# Patient Record
Sex: MALE | Race: WHITE | NOT HISPANIC OR LATINO | Employment: OTHER | URBAN - NONMETROPOLITAN AREA
[De-identification: names, ages, dates, MRNs, and addresses within clinical notes are randomized per-mention and may not be internally consistent; named-entity substitution may affect disease eponyms.]

---

## 2023-08-30 ENCOUNTER — HOSPITAL ENCOUNTER (EMERGENCY)
Facility: HOSPITAL | Age: 38
Discharge: HOME | End: 2023-08-30
Attending: EMERGENCY MEDICINE | Admitting: EMERGENCY MEDICINE

## 2023-08-30 ENCOUNTER — APPOINTMENT (EMERGENCY)
Dept: CT IMAGING | Facility: HOSPITAL | Age: 38
End: 2023-08-30

## 2023-08-30 ENCOUNTER — OFFICE VISIT (OUTPATIENT)
Dept: URGENT CARE | Facility: CLINIC | Age: 38
End: 2023-08-30
Payer: COMMERCIAL

## 2023-08-30 VITALS
RESPIRATION RATE: 18 BRPM | TEMPERATURE: 98 F | HEART RATE: 94 BPM | WEIGHT: 315 LBS | HEIGHT: 70 IN | SYSTOLIC BLOOD PRESSURE: 182 MMHG | OXYGEN SATURATION: 99 % | BODY MASS INDEX: 45.1 KG/M2 | DIASTOLIC BLOOD PRESSURE: 100 MMHG

## 2023-08-30 VITALS
TEMPERATURE: 98.3 F | OXYGEN SATURATION: 96 % | HEART RATE: 70 BPM | DIASTOLIC BLOOD PRESSURE: 100 MMHG | RESPIRATION RATE: 20 BRPM | SYSTOLIC BLOOD PRESSURE: 216 MMHG

## 2023-08-30 DIAGNOSIS — I10 HTN (HYPERTENSION): ICD-10-CM

## 2023-08-30 DIAGNOSIS — M54.50 LOWER BACK PAIN: Primary | ICD-10-CM

## 2023-08-30 DIAGNOSIS — N39.0 UTI (URINARY TRACT INFECTION): ICD-10-CM

## 2023-08-30 DIAGNOSIS — R10.9 RIGHT FLANK PAIN: Primary | ICD-10-CM

## 2023-08-30 LAB
ALBUMIN SERPL BCP-MCNC: 4.8 G/DL (ref 3.5–5)
ALP SERPL-CCNC: 41 U/L (ref 34–104)
ALT SERPL W P-5'-P-CCNC: 51 U/L (ref 7–52)
ANION GAP SERPL CALCULATED.3IONS-SCNC: 8 MMOL/L
APTT PPP: 28 SECONDS (ref 23–37)
AST SERPL W P-5'-P-CCNC: 35 U/L (ref 13–39)
BACTERIA UR QL AUTO: NORMAL /HPF
BASOPHILS # BLD AUTO: 0.06 THOUSANDS/ÂΜL (ref 0–0.1)
BASOPHILS NFR BLD AUTO: 1 % (ref 0–1)
BILIRUB SERPL-MCNC: 1.21 MG/DL (ref 0.2–1)
BILIRUB UR QL STRIP: NEGATIVE
BUN SERPL-MCNC: 17 MG/DL (ref 5–25)
CALCIUM SERPL-MCNC: 9.7 MG/DL (ref 8.4–10.2)
CHLORIDE SERPL-SCNC: 102 MMOL/L (ref 96–108)
CLARITY UR: CLEAR
CO2 SERPL-SCNC: 27 MMOL/L (ref 21–32)
COLOR UR: YELLOW
CREAT SERPL-MCNC: 0.99 MG/DL (ref 0.6–1.3)
EOSINOPHIL # BLD AUTO: 0.3 THOUSAND/ÂΜL (ref 0–0.61)
EOSINOPHIL NFR BLD AUTO: 2 % (ref 0–6)
ERYTHROCYTE [DISTWIDTH] IN BLOOD BY AUTOMATED COUNT: 17.2 % (ref 11.6–15.1)
GFR SERPL CREATININE-BSD FRML MDRD: 95 ML/MIN/1.73SQ M
GLUCOSE SERPL-MCNC: 102 MG/DL (ref 65–140)
GLUCOSE UR STRIP-MCNC: NEGATIVE MG/DL
HCT VFR BLD AUTO: 42.3 % (ref 36.5–49.3)
HGB BLD-MCNC: 12.9 G/DL (ref 12–17)
HGB UR QL STRIP.AUTO: NEGATIVE
IMM GRANULOCYTES # BLD AUTO: 0.11 THOUSAND/UL (ref 0–0.2)
IMM GRANULOCYTES NFR BLD AUTO: 1 % (ref 0–2)
INR PPP: 0.95 (ref 0.84–1.19)
KETONES UR STRIP-MCNC: NEGATIVE MG/DL
LACTATE SERPL-SCNC: 1.2 MMOL/L (ref 0.5–2)
LEUKOCYTE ESTERASE UR QL STRIP: NEGATIVE
LIPASE SERPL-CCNC: 23 U/L (ref 11–82)
LYMPHOCYTES # BLD AUTO: 2.05 THOUSANDS/ÂΜL (ref 0.6–4.47)
LYMPHOCYTES NFR BLD AUTO: 16 % (ref 14–44)
MCH RBC QN AUTO: 19.7 PG (ref 26.8–34.3)
MCHC RBC AUTO-ENTMCNC: 30.5 G/DL (ref 31.4–37.4)
MCV RBC AUTO: 65 FL (ref 82–98)
MONOCYTES # BLD AUTO: 0.53 THOUSAND/ÂΜL (ref 0.17–1.22)
MONOCYTES NFR BLD AUTO: 4 % (ref 4–12)
NEUTROPHILS # BLD AUTO: 9.6 THOUSANDS/ÂΜL (ref 1.85–7.62)
NEUTS SEG NFR BLD AUTO: 76 % (ref 43–75)
NITRITE UR QL STRIP: NEGATIVE
NON-SQ EPI CELLS URNS QL MICRO: NORMAL /HPF
NRBC BLD AUTO-RTO: 0 /100 WBCS
PH UR STRIP.AUTO: 6 [PH]
PLATELET # BLD AUTO: 335 THOUSANDS/UL (ref 149–390)
PMV BLD AUTO: 10.1 FL (ref 8.9–12.7)
POTASSIUM SERPL-SCNC: 4 MMOL/L (ref 3.5–5.3)
PROT SERPL-MCNC: 8 G/DL (ref 6.4–8.4)
PROT UR STRIP-MCNC: ABNORMAL MG/DL
PROTHROMBIN TIME: 13 SECONDS (ref 11.6–14.5)
RBC # BLD AUTO: 6.55 MILLION/UL (ref 3.88–5.62)
RBC #/AREA URNS AUTO: NORMAL /HPF
SODIUM SERPL-SCNC: 137 MMOL/L (ref 135–147)
SP GR UR STRIP.AUTO: 1.02 (ref 1–1.03)
UROBILINOGEN UR QL STRIP.AUTO: 0.2 E.U./DL
WBC # BLD AUTO: 12.65 THOUSAND/UL (ref 4.31–10.16)
WBC #/AREA URNS AUTO: NORMAL /HPF

## 2023-08-30 PROCEDURE — 74176 CT ABD & PELVIS W/O CONTRAST: CPT

## 2023-08-30 PROCEDURE — 85025 COMPLETE CBC W/AUTO DIFF WBC: CPT | Performed by: EMERGENCY MEDICINE

## 2023-08-30 PROCEDURE — G1004 CDSM NDSC: HCPCS

## 2023-08-30 PROCEDURE — 96375 TX/PRO/DX INJ NEW DRUG ADDON: CPT

## 2023-08-30 PROCEDURE — 36415 COLL VENOUS BLD VENIPUNCTURE: CPT | Performed by: EMERGENCY MEDICINE

## 2023-08-30 PROCEDURE — 81001 URINALYSIS AUTO W/SCOPE: CPT | Performed by: EMERGENCY MEDICINE

## 2023-08-30 PROCEDURE — 83605 ASSAY OF LACTIC ACID: CPT | Performed by: EMERGENCY MEDICINE

## 2023-08-30 PROCEDURE — G0382 LEV 3 HOSP TYPE B ED VISIT: HCPCS

## 2023-08-30 PROCEDURE — 83690 ASSAY OF LIPASE: CPT | Performed by: EMERGENCY MEDICINE

## 2023-08-30 PROCEDURE — 85610 PROTHROMBIN TIME: CPT | Performed by: EMERGENCY MEDICINE

## 2023-08-30 PROCEDURE — 99285 EMERGENCY DEPT VISIT HI MDM: CPT | Performed by: EMERGENCY MEDICINE

## 2023-08-30 PROCEDURE — 80053 COMPREHEN METABOLIC PANEL: CPT | Performed by: EMERGENCY MEDICINE

## 2023-08-30 PROCEDURE — 99284 EMERGENCY DEPT VISIT MOD MDM: CPT

## 2023-08-30 PROCEDURE — 96374 THER/PROPH/DIAG INJ IV PUSH: CPT

## 2023-08-30 PROCEDURE — 96361 HYDRATE IV INFUSION ADD-ON: CPT

## 2023-08-30 PROCEDURE — 85730 THROMBOPLASTIN TIME PARTIAL: CPT | Performed by: EMERGENCY MEDICINE

## 2023-08-30 RX ORDER — DULOXETIN HYDROCHLORIDE 60 MG/1
60 CAPSULE, DELAYED RELEASE ORAL DAILY
COMMUNITY
Start: 2023-08-29

## 2023-08-30 RX ORDER — CEPHALEXIN 250 MG/1
500 CAPSULE ORAL ONCE
Status: COMPLETED | OUTPATIENT
Start: 2023-08-30 | End: 2023-08-30

## 2023-08-30 RX ORDER — FENOFIBRATE 145 MG/1
145 TABLET, COATED ORAL DAILY
COMMUNITY
Start: 2023-06-26 | End: 2024-06-25

## 2023-08-30 RX ORDER — CELECOXIB 50 MG/1
50 CAPSULE ORAL DAILY
COMMUNITY

## 2023-08-30 RX ORDER — PANTOPRAZOLE SODIUM 40 MG/1
40 TABLET, DELAYED RELEASE ORAL DAILY
COMMUNITY
Start: 2023-08-23 | End: 2024-08-22

## 2023-08-30 RX ORDER — KETOROLAC TROMETHAMINE 30 MG/ML
30 INJECTION, SOLUTION INTRAMUSCULAR; INTRAVENOUS ONCE
Status: COMPLETED | OUTPATIENT
Start: 2023-08-30 | End: 2023-08-30

## 2023-08-30 RX ORDER — DICLOFENAC SODIUM 75 MG/1
75 TABLET, DELAYED RELEASE ORAL EVERY 12 HOURS PRN
COMMUNITY
Start: 2023-06-05

## 2023-08-30 RX ORDER — TRAZODONE HYDROCHLORIDE 100 MG/1
100 TABLET ORAL
COMMUNITY
Start: 2023-08-29

## 2023-08-30 RX ORDER — VALSARTAN 320 MG/1
320 TABLET ORAL DAILY
COMMUNITY
Start: 2023-08-23 | End: 2024-08-22

## 2023-08-30 RX ORDER — ONDANSETRON 2 MG/ML
4 INJECTION INTRAMUSCULAR; INTRAVENOUS ONCE
Status: COMPLETED | OUTPATIENT
Start: 2023-08-30 | End: 2023-08-30

## 2023-08-30 RX ORDER — CEPHALEXIN 500 MG/1
500 CAPSULE ORAL 4 TIMES DAILY
Qty: 20 CAPSULE | Refills: 0 | Status: SHIPPED | OUTPATIENT
Start: 2023-08-30 | End: 2023-09-04

## 2023-08-30 RX ORDER — HYDROCHLOROTHIAZIDE 25 MG/1
25 TABLET ORAL DAILY
COMMUNITY
Start: 2023-08-23 | End: 2024-08-22

## 2023-08-30 RX ADMIN — SODIUM CHLORIDE 1000 ML: 0.9 INJECTION, SOLUTION INTRAVENOUS at 10:43

## 2023-08-30 RX ADMIN — CEPHALEXIN 500 MG: 250 CAPSULE ORAL at 11:42

## 2023-08-30 RX ADMIN — KETOROLAC TROMETHAMINE 30 MG: 30 INJECTION, SOLUTION INTRAMUSCULAR; INTRAVENOUS at 10:41

## 2023-08-30 RX ADMIN — ONDANSETRON 4 MG: 2 INJECTION INTRAMUSCULAR; INTRAVENOUS at 10:41

## 2023-08-30 NOTE — ED PROVIDER NOTES
History  Chief Complaint   Patient presents with   • Flank Pain     Right side flank pain with nausea and diarrhea since Friday. Sent from urgent care for further eval     Patient is a 65-year-old male presents the emergency department complaining of pain in the right lower back and right flank region radiating around to the right side of the abdomen started about 5 days ago associated with some nausea no vomiting has had diarrhea no constipation has had subjective fevers and chills. Patient with history of multiple prior kidney stones. No dysuria or blood in the urine or difficulty urinating. History provided by:  Patient  Flank Pain  Pain location:  R flank  Pain quality: aching and cramping    Pain radiates to:  Back  Pain severity:  Moderate  Onset quality:  Gradual  Duration:  5 days  Timing:  Intermittent  Progression:  Waxing and waning  Chronicity:  New  Associated symptoms: nausea    Associated symptoms: no chest pain, no chills, no constipation, no cough, no diarrhea, no dysuria, no fatigue, no fever, no hematuria, no shortness of breath, no sore throat and no vomiting        Prior to Admission Medications   Prescriptions Last Dose Informant Patient Reported? Taking?    DULoxetine (CYMBALTA) 60 mg delayed release capsule   Yes No   Sig: Take 60 mg by mouth daily   celecoxib (CeleBREX) 50 MG capsule   Yes No   Sig: Take 50 mg by mouth daily   diclofenac (VOLTAREN) 75 mg EC tablet   Yes No   Sig: Take 75 mg by mouth every 12 (twelve) hours as needed   fenofibrate (TRICOR) 145 mg tablet   Yes No   Sig: Take 145 mg by mouth daily   hydrochlorothiazide (HYDRODIURIL) 25 mg tablet   Yes No   Sig: Take 25 mg by mouth daily   pantoprazole (PROTONIX) 40 mg tablet   Yes No   Sig: Take 40 mg by mouth daily   traZODone (DESYREL) 100 mg tablet   Yes No   Sig: Take 100 mg by mouth   valsartan (DIOVAN) 320 MG tablet   Yes No   Sig: Take 320 mg by mouth daily      Facility-Administered Medications: None       Past Medical History:   Diagnosis Date   • Hypertension        Past Surgical History:   Procedure Laterality Date   • HERNIA REPAIR         History reviewed. No pertinent family history. I have reviewed and agree with the history as documented. E-Cigarette/Vaping   • E-Cigarette Use Never User      E-Cigarette/Vaping Substances     Social History     Tobacco Use   • Smoking status: Never   • Smokeless tobacco: Never   Vaping Use   • Vaping Use: Never used   Substance Use Topics   • Alcohol use: Never   • Drug use: Never       Review of Systems   Constitutional: Negative for activity change, appetite change, chills, fatigue and fever. HENT: Negative for congestion, ear pain, rhinorrhea and sore throat. Eyes: Negative for discharge, redness and visual disturbance. Respiratory: Negative for cough, chest tightness, shortness of breath and wheezing. Cardiovascular: Negative for chest pain and palpitations. Gastrointestinal: Positive for abdominal pain and nausea. Negative for constipation, diarrhea and vomiting. Endocrine: Negative for polydipsia and polyuria. Genitourinary: Positive for flank pain. Negative for difficulty urinating, dysuria, frequency, hematuria and urgency. Musculoskeletal: Positive for back pain. Negative for arthralgias and myalgias. Skin: Negative for color change, pallor and rash. Neurological: Negative for dizziness, weakness, light-headedness, numbness and headaches. Hematological: Negative for adenopathy. Does not bruise/bleed easily. All other systems reviewed and are negative. Physical Exam  Physical Exam  Vitals and nursing note reviewed. Constitutional:       Appearance: He is well-developed. HENT:      Head: Normocephalic and atraumatic. Right Ear: External ear normal.      Left Ear: External ear normal.      Nose: Nose normal.   Eyes:      Conjunctiva/sclera: Conjunctivae normal.      Pupils: Pupils are equal, round, and reactive to light. Cardiovascular:      Rate and Rhythm: Normal rate and regular rhythm. Heart sounds: Normal heart sounds. Pulmonary:      Effort: Pulmonary effort is normal. No respiratory distress. Breath sounds: Normal breath sounds. No wheezing or rales. Chest:      Chest wall: No tenderness. Abdominal:      General: Bowel sounds are normal. There is no distension. Palpations: Abdomen is soft. Tenderness: There is abdominal tenderness in the right lower quadrant. There is right CVA tenderness. There is no guarding or rebound. Musculoskeletal:         General: Normal range of motion. Cervical back: Normal range of motion and neck supple. Skin:     General: Skin is warm and dry. Neurological:      Mental Status: He is alert and oriented to person, place, and time. Cranial Nerves: No cranial nerve deficit. Sensory: No sensory deficit.          Vital Signs  ED Triage Vitals [08/30/23 1015]   Temperature Pulse Respirations Blood Pressure SpO2   98.3 °F (36.8 °C) 70 20 (!) 216/100 96 %      Temp Source Heart Rate Source Patient Position - Orthostatic VS BP Location FiO2 (%)   Temporal Monitor Sitting Left arm --      Pain Score       10 - Worst Possible Pain           Vitals:    08/30/23 1015   BP: (!) 216/100   Pulse: 70   Patient Position - Orthostatic VS: Sitting         Visual Acuity      ED Medications  Medications   sodium chloride 0.9 % bolus 1,000 mL (1,000 mL Intravenous New Bag 8/30/23 1043)   cephalexin (KEFLEX) capsule 500 mg (has no administration in time range)   ketorolac (TORADOL) injection 30 mg (30 mg Intravenous Given 8/30/23 1041)   ondansetron (ZOFRAN) injection 4 mg (4 mg Intravenous Given 8/30/23 1041)       Diagnostic Studies  Results Reviewed     Procedure Component Value Units Date/Time    Urine Microscopic [[id number]]  (Normal) Collected: 08/30/23 1033    Lab Status: Final result Specimen: Urine, Clean Catch Updated: 08/30/23 1125     RBC, UA None Seen /hpf WBC, UA None Seen /hpf      Epithelial Cells Occasional /hpf      Bacteria, UA Occasional /hpf     UA w Reflex to Microscopic w Reflex to Culture [[id number]]  (Abnormal) Collected: 08/30/23 1033    Lab Status: Final result Specimen: Urine, Clean Catch Updated: 08/30/23 1104     Color, UA Yellow     Clarity, UA Clear     Specific Gravity, UA 1.025     pH, UA 6.0     Leukocytes, UA Negative     Nitrite, UA Negative     Protein, UA Trace mg/dl      Glucose, UA Negative mg/dl      Ketones, UA Negative mg/dl      Urobilinogen, UA 0.2 E.U./dl      Bilirubin, UA Negative     Occult Blood, UA Negative    Comprehensive metabolic panel [[id number]]  (Abnormal) Collected: 08/30/23 1033    Lab Status: Final result Specimen: Blood from Arm, Left Updated: 08/30/23 1100     Sodium 137 mmol/L      Potassium 4.0 mmol/L      Chloride 102 mmol/L      CO2 27 mmol/L      ANION GAP 8 mmol/L      BUN 17 mg/dL      Creatinine 0.99 mg/dL      Glucose 102 mg/dL      Calcium 9.7 mg/dL      AST 35 U/L      ALT 51 U/L      Alkaline Phosphatase 41 U/L      Total Protein 8.0 g/dL      Albumin 4.8 g/dL      Total Bilirubin 1.21 mg/dL      eGFR 95 ml/min/1.73sq m     Narrative:      [place] guidelines for Chronic Kidney Disease (CKD):   •  Stage 1 with normal or high GFR (GFR > 90 mL/min/1.73 square meters)  •  Stage 2 Mild CKD (GFR = 60-89 mL/min/1.73 square meters)  •  Stage 3A Moderate CKD (GFR = 45-59 mL/min/1.73 square meters)  •  Stage 3B Moderate CKD (GFR = 30-44 mL/min/1.73 square meters)  •  Stage 4 Severe CKD (GFR = 15-29 mL/min/1.73 square meters)  •  Stage 5 End Stage CKD (GFR <15 mL/min/1.73 square meters)  Note: GFR calculation is accurate only with a steady state creatinine    Lipase [[id number]]  (Normal) Collected: 08/30/23 1033    Lab Status: Final result Specimen: Blood from Arm, Left Updated: 08/30/23 1100     Lipase 23 u/L     Lactic acid, plasma (w/reflex if result > 2.0) [[id number]]  (Normal) Collected: 08/30/23 1033    Lab Status: Final result Specimen: Blood from Arm, Left Updated: 08/30/23 1059     LACTIC ACID 1.2 mmol/L     Narrative:      Result may be elevated if tourniquet was used during collection. Protime-INR [[id number]]  (Normal) Collected: 08/30/23 1033    Lab Status: Final result Specimen: Blood from Arm, Left Updated: 08/30/23 1055     Protime 13.0 seconds      INR 0.95    APTT [[id number]]  (Normal) Collected: 08/30/23 1033    Lab Status: Final result Specimen: Blood from Arm, Left Updated: 08/30/23 1055     PTT 28 seconds     CBC and differential [[id number]]  (Abnormal) Collected: 08/30/23 1033    Lab Status: Final result Specimen: Blood from Arm, Left Updated: 08/30/23 1039     WBC 12.65 Thousand/uL      RBC 6.55 Million/uL      Hemoglobin 12.9 g/dL      Hematocrit 42.3 %      MCV 65 fL      MCH 19.7 pg      MCHC 30.5 g/dL      RDW 17.2 %      MPV 10.1 fL      Platelets 711 Thousands/uL      nRBC 0 /100 WBCs      Neutrophils Relative 76 %      Immat GRANS % 1 %      Lymphocytes Relative 16 %      Monocytes Relative 4 %      Eosinophils Relative 2 %      Basophils Relative 1 %      Neutrophils Absolute 9.60 Thousands/µL      Immature Grans Absolute 0.11 Thousand/uL      Lymphocytes Absolute 2.05 Thousands/µL      Monocytes Absolute 0.53 Thousand/µL      Eosinophils Absolute 0.30 Thousand/µL      Basophils Absolute 0.06 Thousands/µL                  CT abdomen pelvis wo contrast   Final Result by Tania Mishra MD (08/30 1045)      No CT findings to explain right flank pain. Hepatosplenomegaly and pronounced hepatic steatosis. Workstation performed: [id number]                    Procedures  Procedures         ED Course                                             Medical Decision Making  Differential diagnosis included but not limited to urinary tract infection ureterolithiasis hydronephrosis musculoskeletal back pain.     Patient remained clinically and hemodynamically stable in the emergency department he is afebrile nontoxic well-appearing no evidence of hypertensive urgency or hypertensive emergency present. Work-up in the ED shows bacteriuria given symptoms of chills and subjective fevers with leukocytosis and symptoms of lower back discomfort we will treat with short course of antibiotics for suspected UTI. No signs of obstructive uropathy. advised rest plenty fluids and supportive care and prompt follow-up with primary physician for further evaluation and treatment and obtain test results. return precautions and anticipatory guidance discussed. HTN (hypertension): acute illness or injury  Lower back pain: acute illness or injury  UTI (urinary tract infection): acute illness or injury  Amount and/or Complexity of Data Reviewed  Labs: ordered. Decision-making details documented in ED Course. Radiology: ordered and independent interpretation performed. Decision-making details documented in ED Course. Risk  Prescription drug management. Disposition  Final diagnoses:   Lower back pain   UTI (urinary tract infection)   HTN (hypertension)     Time reflects when diagnosis was documented in both MDM as applicable and the Disposition within this note     Time User Action Codes Description Comment    8/30/2023 11:04 AM Yesica Montezuma Add [M54.50] Lower back pain     8/30/2023 11:34 AM Yesica Montezuma Add [N39.0] UTI (urinary tract infection)     8/30/2023 11:34 AM Yesica Montezuma Add [I10] HTN (hypertension)       ED Disposition     ED Disposition   Discharge    Condition   Stable    Date/Time   Wed Aug 30, 2023 11:34 AM    Comment   Rachelle Began discharge to home/self care.                Follow-up Information     Follow up With Specialties Details Why Contact Info      Schedule an appointment as soon as possible for a visit in 3 days  Your primary care physician          Patient's Medications   Discharge Prescriptions    CEPHALEXIN (KEFLEX) 500 MG CAPSULE    Take 1 capsule (500 mg total) by mouth 4 (four) times a day for 5 days       Start Date: 8/30/2023 End Date: 9/4/2023       Order Dose: 500 mg       Quantity: 20 capsule    Refills: 0       No discharge procedures on file.     PDMP Review     None          ED Provider  Electronically Signed by           Jose Ramsey DO  08/30/23 1137

## 2023-08-30 NOTE — PROGRESS NOTES
[facility]        NAME: Cinthia Goode is a 43 y.o. male  : 1985    MRN: [id number]  DATE: 2023  TIME: 9:38 AM    Assessment and Plan   Right flank pain [R10.9]  1. Right flank pain  Transfer to other facility        Discussed problem with patient. Recent blood and urine work-up showed no acute abnormalities the patient is reporting new onset of nausea, chills, 10 out of 10 right-sided flank pain with past medical history of stones. Advised higher level of care and patient is opting to go to [address] Father is driving via personal vehicle. Patient Instructions       Follow up with PCP in 3-5 days. Proceed to  ER if symptoms worsen. Chief Complaint     Chief Complaint   Patient presents with   • Flank Pain     C/o right flank pain, nausea, and chills that began on Friday; pt had a UA performed yesterday which came back negative         History of Present Illness       C/o right flank pain, nausea, and chills that began on Friday; pt had a UA performed yesterday which came back negative. PMH of stone. Yesterday had a UA, CMP, CBC which were normal.  Rates his pain 10 out of 10. Has not tried anything for his symptoms. Denies any urinary abnormalities but reports he is urinating more frequently. Also reports episodes of diarrhea. Flank Pain  Associated symptoms include abdominal pain. Pertinent negatives include no chest pain, dysuria or fever (no tylenol or motrin today). Review of Systems   Review of Systems   Constitutional: Positive for appetite change and chills. Negative for fever (no tylenol or motrin today). Respiratory: Negative for cough, shortness of breath, wheezing and stridor. Cardiovascular: Negative for chest pain and palpitations. Gastrointestinal: Positive for abdominal pain, diarrhea and nausea. Negative for constipation and vomiting. Genitourinary: Positive for flank pain (right lower) and frequency.  Negative for dysuria, hematuria and urgency. Musculoskeletal: Negative for back pain. Current Medications       Current Outpatient Medications:   •  celecoxib (CeleBREX) 50 MG capsule, Take 50 mg by mouth daily, Disp: , Rfl:   •  diclofenac (VOLTAREN) 75 mg EC tablet, Take 75 mg by mouth every 12 (twelve) hours as needed, Disp: , Rfl:   •  DULoxetine (CYMBALTA) 60 mg delayed release capsule, Take 60 mg by mouth daily, Disp: , Rfl:   •  fenofibrate (TRICOR) 145 mg tablet, Take 145 mg by mouth daily, Disp: , Rfl:   •  hydrochlorothiazide (HYDRODIURIL) 25 mg tablet, Take 25 mg by mouth daily, Disp: , Rfl:   •  pantoprazole (PROTONIX) 40 mg tablet, Take 40 mg by mouth daily, Disp: , Rfl:   •  traZODone (DESYREL) 100 mg tablet, Take 100 mg by mouth, Disp: , Rfl:   •  valsartan (DIOVAN) 320 MG tablet, Take 320 mg by mouth daily, Disp: , Rfl:     Current Allergies     Allergies as of 08/30/2023   • (No Known Allergies)            The following portions of the patient's history were reviewed and updated as appropriate: allergies, current medications, past family history, past medical history, past social history, past surgical history and problem list.     Past Medical History:   Diagnosis Date   • Hypertension        Past Surgical History:   Procedure Laterality Date   • HERNIA REPAIR         History reviewed. No pertinent family history. Medications have been verified. Objective   BP (!) 182/100   Pulse 94   Temp 98 °F (36.7 °C)   Resp 18   Ht 5' 10" (1.778 m)   Wt (!) 192 kg (423 lb)   SpO2 99%   BMI 60.69 kg/m²        Physical Exam     Physical Exam  Vitals and nursing note reviewed. Constitutional:       General: He is not in acute distress. Appearance: Normal appearance. He is obese. He is not ill-appearing, toxic-appearing or diaphoretic. Cardiovascular:      Rate and Rhythm: Normal rate and regular rhythm. Pulses: Normal pulses. Heart sounds: Normal heart sounds. No murmur heard. No friction rub.  No gallop. Pulmonary:      Effort: Pulmonary effort is normal. No respiratory distress. Breath sounds: Normal breath sounds. No stridor. No wheezing, rhonchi or rales. Chest:      Chest wall: No tenderness. Abdominal:      General: Abdomen is flat. Bowel sounds are normal. There is no distension. Palpations: Abdomen is soft. There is no mass. Tenderness: There is abdominal tenderness (Generalized tenderness). There is right CVA tenderness. There is no left CVA tenderness, guarding or rebound. Hernia: No hernia is present. Neurological:      Mental Status: He is alert.

## 2023-12-22 ENCOUNTER — APPOINTMENT (EMERGENCY)
Dept: CT IMAGING | Facility: HOSPITAL | Age: 38
End: 2023-12-22
Payer: COMMERCIAL

## 2023-12-22 ENCOUNTER — HOSPITAL ENCOUNTER (EMERGENCY)
Facility: HOSPITAL | Age: 38
Discharge: HOME | End: 2023-12-22
Attending: EMERGENCY MEDICINE
Payer: COMMERCIAL

## 2023-12-22 VITALS
BODY MASS INDEX: 45.1 KG/M2 | WEIGHT: 315 LBS | HEIGHT: 70 IN | SYSTOLIC BLOOD PRESSURE: 205 MMHG | HEART RATE: 93 BPM | RESPIRATION RATE: 18 BRPM | TEMPERATURE: 98 F | DIASTOLIC BLOOD PRESSURE: 103 MMHG | OXYGEN SATURATION: 98 %

## 2023-12-22 DIAGNOSIS — M54.50 LOW BACK PAIN: Primary | ICD-10-CM

## 2023-12-22 LAB
BILIRUB UR QL STRIP: NEGATIVE
CLARITY UR: CLEAR
COLOR UR: YELLOW
GLUCOSE UR STRIP-MCNC: NEGATIVE MG/DL
HGB UR QL STRIP.AUTO: NEGATIVE
KETONES UR STRIP-MCNC: NEGATIVE MG/DL
LEUKOCYTE ESTERASE UR QL STRIP: NEGATIVE
NITRITE UR QL STRIP: NEGATIVE
PH UR STRIP.AUTO: 6 [PH]
PROT UR STRIP-MCNC: NEGATIVE MG/DL
SP GR UR STRIP.AUTO: 1.02 (ref 1–1.03)
UROBILINOGEN UR QL STRIP.AUTO: 0.2 E.U./DL

## 2023-12-22 PROCEDURE — 74176 CT ABD & PELVIS W/O CONTRAST: CPT

## 2023-12-22 PROCEDURE — G1004 CDSM NDSC: HCPCS

## 2023-12-22 PROCEDURE — 99284 EMERGENCY DEPT VISIT MOD MDM: CPT | Performed by: EMERGENCY MEDICINE

## 2023-12-22 PROCEDURE — 81003 URINALYSIS AUTO W/O SCOPE: CPT | Performed by: EMERGENCY MEDICINE

## 2023-12-22 RX ORDER — PREDNISONE 10 MG/1
TABLET ORAL
Qty: 30 TABLET | Refills: 0 | Status: SHIPPED | OUTPATIENT
Start: 2023-12-22

## 2023-12-22 RX ORDER — KETOROLAC TROMETHAMINE 30 MG/ML
30 INJECTION, SOLUTION INTRAMUSCULAR; INTRAVENOUS ONCE
Status: COMPLETED | OUTPATIENT
Start: 2023-12-22 | End: 2023-12-22

## 2023-12-22 RX ORDER — METHOCARBAMOL 500 MG/1
500 TABLET, FILM COATED ORAL 2 TIMES DAILY
Qty: 20 TABLET | Refills: 0 | Status: SHIPPED | OUTPATIENT
Start: 2023-12-22

## 2023-12-22 RX ORDER — LIDOCAINE 50 MG/G
1 PATCH TOPICAL DAILY
Qty: 7 PATCH | Refills: 0 | Status: SHIPPED | OUTPATIENT
Start: 2023-12-22 | End: 2023-12-29

## 2023-12-22 RX ORDER — NAPROXEN 500 MG/1
500 TABLET ORAL 2 TIMES DAILY WITH MEALS
Qty: 30 TABLET | Refills: 0 | Status: SHIPPED | OUTPATIENT
Start: 2023-12-22

## 2023-12-22 RX ADMIN — KETOROLAC TROMETHAMINE 30 MG: 30 INJECTION, SOLUTION INTRAMUSCULAR at 15:50

## 2023-12-22 NOTE — ED PROVIDER NOTES
History  Chief Complaint   Patient presents with    Back Pain     Pt reports back pain x two weeks ago start; seen at pcp & started on steroids & muscle relaxer w no relief; hx of kidney stones but does not feel similar to previous     Patient plaints of right low back pain that goes into his right groin and down the right leg for the past 2 weeks.  Was placed on Medrol Dosepak and Flexeril without relief.  Worse with movement.  No loss of bladder or bowel function.  Does have a past history of kidney stones.  No rash.  No trauma.      History provided by:  Patient   used: No    Back Pain  Location:  Lumbar spine  Quality:  Aching  Radiates to: Right leg and groin.  Pain severity:  Mild  Onset quality:  Gradual  Duration:  2 weeks  Timing:  Constant  Progression:  Unchanged  Chronicity:  New  Context: not falling, not jumping from heights, not occupational injury and not recent illness    Relieved by:  Nothing  Worsened by:  Movement and bending  Ineffective treatments: Flexeril Medrol Dosepak.  Associated symptoms: no abdominal pain, no abdominal swelling, no bowel incontinence, no chest pain, no dysuria, no fever, no headaches, no numbness, no perianal numbness and no tingling        Prior to Admission Medications   Prescriptions Last Dose Informant Patient Reported? Taking?   DULoxetine (CYMBALTA) 60 mg delayed release capsule   Yes No   Sig: Take 60 mg by mouth daily   celecoxib (CeleBREX) 50 MG capsule   Yes No   Sig: Take 50 mg by mouth daily   diclofenac (VOLTAREN) 75 mg EC tablet   Yes No   Sig: Take 75 mg by mouth every 12 (twelve) hours as needed   fenofibrate (TRICOR) 145 mg tablet   Yes No   Sig: Take 145 mg by mouth daily   hydrochlorothiazide (HYDRODIURIL) 25 mg tablet   Yes No   Sig: Take 25 mg by mouth daily   pantoprazole (PROTONIX) 40 mg tablet   Yes No   Sig: Take 40 mg by mouth daily   traZODone (DESYREL) 100 mg tablet   Yes No   Sig: Take 100 mg by mouth   valsartan (DIOVAN)  320 MG tablet   Yes No   Sig: Take 320 mg by mouth daily      Facility-Administered Medications: None       Past Medical History:   Diagnosis Date    Hypertension        Past Surgical History:   Procedure Laterality Date    HERNIA REPAIR         History reviewed. No pertinent family history.  I have reviewed and agree with the history as documented.    E-Cigarette/Vaping    E-Cigarette Use Never User      E-Cigarette/Vaping Substances     Social History     Tobacco Use    Smoking status: Never    Smokeless tobacco: Never   Vaping Use    Vaping status: Never Used   Substance Use Topics    Alcohol use: Never    Drug use: Never       Review of Systems   Constitutional:  Negative for chills and fever.   HENT:  Negative for ear pain, hearing loss, sore throat, trouble swallowing and voice change.    Eyes:  Negative for pain and discharge.   Respiratory:  Negative for cough, shortness of breath and wheezing.    Cardiovascular:  Negative for chest pain and palpitations.   Gastrointestinal:  Negative for abdominal pain, blood in stool, bowel incontinence, constipation, diarrhea, nausea and vomiting.   Genitourinary:  Negative for dysuria, flank pain, frequency and hematuria.   Musculoskeletal:  Positive for back pain. Negative for joint swelling, neck pain and neck stiffness.   Skin:  Negative for rash and wound.   Neurological:  Negative for dizziness, tingling, seizures, syncope, facial asymmetry, numbness and headaches.   Psychiatric/Behavioral:  Negative for hallucinations, self-injury and suicidal ideas.    All other systems reviewed and are negative.      Physical Exam  Physical Exam  Vitals and nursing note reviewed.   Constitutional:       General: He is not in acute distress.     Appearance: He is well-developed.   HENT:      Head: Normocephalic and atraumatic.      Right Ear: External ear normal.      Left Ear: External ear normal.   Eyes:      General: No scleral icterus.        Right eye: No discharge.          Left eye: No discharge.      Extraocular Movements: Extraocular movements intact.      Conjunctiva/sclera: Conjunctivae normal.   Cardiovascular:      Rate and Rhythm: Normal rate and regular rhythm.      Heart sounds: Normal heart sounds. No murmur heard.  Pulmonary:      Effort: Pulmonary effort is normal.      Breath sounds: Normal breath sounds. No wheezing or rales.   Abdominal:      General: Bowel sounds are normal. There is no distension.      Palpations: Abdomen is soft.      Tenderness: There is no abdominal tenderness. There is no guarding or rebound.   Musculoskeletal:         General: No deformity. Normal range of motion.      Cervical back: Normal range of motion and neck supple.   Skin:     General: Skin is warm and dry.      Findings: No rash.   Neurological:      General: No focal deficit present.      Mental Status: He is alert and oriented to person, place, and time.      Cranial Nerves: No cranial nerve deficit.   Psychiatric:         Mood and Affect: Mood normal.         Behavior: Behavior normal.         Thought Content: Thought content normal.         Judgment: Judgment normal.         Vital Signs  ED Triage Vitals [12/22/23 1346]   Temperature Pulse Respirations Blood Pressure SpO2   98 °F (36.7 °C) 93 18 (!) 205/103 98 %      Temp Source Heart Rate Source Patient Position - Orthostatic VS BP Location FiO2 (%)   Tympanic Monitor Lying Left arm --      Pain Score       10 - Worst Possible Pain           Vitals:    12/22/23 1346   BP: (!) 205/103   Pulse: 93   Patient Position - Orthostatic VS: Lying         Visual Acuity      ED Medications  Medications   ketorolac (TORADOL) injection 30 mg (has no administration in time range)       Diagnostic Studies  Results Reviewed       Procedure Component Value Units Date/Time    UA w Reflex to Microscopic w Reflex to Culture [[id number]] Collected: 12/22/23 1419    Lab Status: Final result Specimen: Urine, Clean Catch Updated: 12/22/23 1427     Color, UA  Yellow     Clarity, UA Clear     Specific Gravity, UA 1.025     pH, UA 6.0     Leukocytes, UA Negative     Nitrite, UA Negative     Protein, UA Negative mg/dl      Glucose, UA Negative mg/dl      Ketones, UA Negative mg/dl      Urobilinogen, UA 0.2 E.U./dl      Bilirubin, UA Negative     Occult Blood, UA Negative                   CT abdomen pelvis wo contrast   Final Result by Craig Le MD (12/22 1453)      No acute findings in the abdomen or pelvis.      Unchanged hepatosplenomegaly and hepatic steatosis compared to 8/30/2023.            Workstation performed: [id number]         CT recon only lumbar spine   Final Result by Craig Le MD (12/22 1455)      No fracture or traumatic subluxation.            Workstation performed: [id number]                    Procedures  Procedures         ED Course                               SBIRT 20yo+      Flowsheet Row Most Recent Value   Initial Alcohol Screen: US AUDIT-C     1. How often do you have a drink containing alcohol? 0 Filed at: 12/22/2023 1345   2. How many drinks containing alcohol do you have on a typical day you are drinking?  0 Filed at: 12/22/2023 1345   3a. Male UNDER 65: How often do you have five or more drinks on one occasion? 0 Filed at: 12/22/2023 1345   3b. FEMALE Any Age, or MALE 65+: How often do you have 4 or more drinks on one occassion? 0 Filed at: 12/22/2023 1345   Audit-C Score 0 Filed at: 12/22/2023 1345   ALEXANDER: How many times in the past year have you...    Used an illegal drug or used a prescription medication for non-medical reasons? Never Filed at: 12/22/2023 1345                      Medical Decision Making  Amount and/or Complexity of Data Reviewed  Labs: ordered.  Radiology: ordered. Decision-making details documented in ED Course.  Discussion of management or test interpretation with external provider(s): Differential diagnose includes not limited to kidney stone, sciatica, disc disease, lumbar  stenosis.    Risk  Prescription drug management.             Disposition  Final diagnoses:   Low back pain     Time reflects when diagnosis was documented in both MDM as applicable and the Disposition within this note       Time User Action Codes Description Comment    12/22/2023  3:38 PM Nuno Glez Add [M54.50] Low back pain           ED Disposition       ED Disposition   Discharge    Condition   Stable    Date/Time   Fri Dec 22, 2023 1538    Comment   Yusef Mendieta discharge to home/self care.                   Follow-up Information       Follow up With Specialties Details Why Contact Info    Drew Parra MD Pain Medicine Call in 1 day  [address] [facility]  [address] [place] [address] [id number] [phone]              Patient's Medications   Discharge Prescriptions    LIDOCAINE (LIDODERM) 5 %    Apply 1 patch topically over 12 hours daily for 7 days Apply to low back region of pain.  Remove & Discard patch within 12 hours or as directed by MD       Start Date: 12/22/2023End Date: 12/29/2023       Order Dose: 1 patch       Quantity: 7 patch    Refills: 0    METHOCARBAMOL (ROBAXIN) 500 MG TABLET    Take 1 tablet (500 mg total) by mouth 2 (two) times a day       Start Date: 12/22/2023End Date: --       Order Dose: 500 mg       Quantity: 20 tablet    Refills: 0    NAPROXEN (NAPROSYN) 500 MG TABLET    Take 1 tablet (500 mg total) by mouth 2 (two) times a day with meals       Start Date: 12/22/2023End Date: --       Order Dose: 500 mg       Quantity: 30 tablet    Refills: 0    PREDNISONE 10 MG TABLET    Take 4 tabs daily for thee days then take three tabs daily for three days then take two tablets daily for three days then take one tab daily for three days.       Start Date: 12/22/2023End Date: --       Order Dose: --       Quantity: 30 tablet    Refills: 0       No discharge procedures on file.    PDMP Review       None            ED Provider  Electronically Signed by             Nuno Glez  MD  12/22/23 1540

## 2024-05-07 ENCOUNTER — HOSPITAL ENCOUNTER (EMERGENCY)
Facility: HOSPITAL | Age: 39
Discharge: HOME | End: 2024-05-07
Attending: EMERGENCY MEDICINE

## 2024-05-07 ENCOUNTER — APPOINTMENT (EMERGENCY)
Dept: CT IMAGING | Facility: HOSPITAL | Age: 39
End: 2024-05-07

## 2024-05-07 VITALS
DIASTOLIC BLOOD PRESSURE: 76 MMHG | SYSTOLIC BLOOD PRESSURE: 152 MMHG | BODY MASS INDEX: 45.1 KG/M2 | RESPIRATION RATE: 20 BRPM | WEIGHT: 315 LBS | TEMPERATURE: 97.3 F | OXYGEN SATURATION: 99 % | HEIGHT: 70 IN | HEART RATE: 52 BPM

## 2024-05-07 DIAGNOSIS — K62.5 RECTAL BLEEDING: ICD-10-CM

## 2024-05-07 DIAGNOSIS — R10.10 PAIN OF UPPER ABDOMEN: Primary | ICD-10-CM

## 2024-05-07 DIAGNOSIS — K52.9 GASTROENTERITIS: ICD-10-CM

## 2024-05-07 LAB
ALBUMIN SERPL BCP-MCNC: 4.8 G/DL (ref 3.5–5)
ALP SERPL-CCNC: 40 U/L (ref 34–104)
ALT SERPL W P-5'-P-CCNC: 44 U/L (ref 7–52)
ANION GAP SERPL CALCULATED.3IONS-SCNC: 9 MMOL/L (ref 4–13)
AST SERPL W P-5'-P-CCNC: 29 U/L (ref 13–39)
BASOPHILS # BLD AUTO: 0.08 THOUSANDS/ÂΜL (ref 0–0.1)
BASOPHILS NFR BLD AUTO: 1 % (ref 0–1)
BILIRUB SERPL-MCNC: 0.7 MG/DL (ref 0.2–1)
BILIRUB UR QL STRIP: NEGATIVE
BUN SERPL-MCNC: 21 MG/DL (ref 5–25)
CALCIUM SERPL-MCNC: 9.9 MG/DL (ref 8.4–10.2)
CHLORIDE SERPL-SCNC: 103 MMOL/L (ref 96–108)
CLARITY UR: CLEAR
CO2 SERPL-SCNC: 27 MMOL/L (ref 21–32)
COLOR UR: YELLOW
CREAT SERPL-MCNC: 0.96 MG/DL (ref 0.6–1.3)
EOSINOPHIL # BLD AUTO: 0.27 THOUSAND/ÂΜL (ref 0–0.61)
EOSINOPHIL NFR BLD AUTO: 2 % (ref 0–6)
ERYTHROCYTE [DISTWIDTH] IN BLOOD BY AUTOMATED COUNT: 17.3 % (ref 11.6–15.1)
GFR SERPL CREATININE-BSD FRML MDRD: 99 ML/MIN/1.73SQ M
GLUCOSE SERPL-MCNC: 108 MG/DL (ref 65–140)
GLUCOSE UR STRIP-MCNC: NEGATIVE MG/DL
HCT VFR BLD AUTO: 42.9 % (ref 36.5–49.3)
HGB BLD-MCNC: 13.1 G/DL (ref 12–17)
HGB UR QL STRIP.AUTO: NEGATIVE
IMM GRANULOCYTES # BLD AUTO: 0.09 THOUSAND/UL (ref 0–0.2)
IMM GRANULOCYTES NFR BLD AUTO: 1 % (ref 0–2)
KETONES UR STRIP-MCNC: NEGATIVE MG/DL
LEUKOCYTE ESTERASE UR QL STRIP: NEGATIVE
LIPASE SERPL-CCNC: 25 U/L (ref 11–82)
LYMPHOCYTES # BLD AUTO: 2.77 THOUSANDS/ÂΜL (ref 0.6–4.47)
LYMPHOCYTES NFR BLD AUTO: 25 % (ref 14–44)
MCH RBC QN AUTO: 19.7 PG (ref 26.8–34.3)
MCHC RBC AUTO-ENTMCNC: 30.5 G/DL (ref 31.4–37.4)
MCV RBC AUTO: 65 FL (ref 82–98)
MONOCYTES # BLD AUTO: 0.63 THOUSAND/ÂΜL (ref 0.17–1.22)
MONOCYTES NFR BLD AUTO: 6 % (ref 4–12)
NEUTROPHILS # BLD AUTO: 7.35 THOUSANDS/ÂΜL (ref 1.85–7.62)
NEUTS SEG NFR BLD AUTO: 65 % (ref 43–75)
NITRITE UR QL STRIP: NEGATIVE
NRBC BLD AUTO-RTO: 0 /100 WBCS
PH UR STRIP.AUTO: 6 [PH]
PLATELET # BLD AUTO: 378 THOUSANDS/UL (ref 149–390)
PMV BLD AUTO: 9.8 FL (ref 8.9–12.7)
POTASSIUM SERPL-SCNC: 3.8 MMOL/L (ref 3.5–5.3)
PROT SERPL-MCNC: 8.2 G/DL (ref 6.4–8.4)
PROT UR STRIP-MCNC: NEGATIVE MG/DL
RBC # BLD AUTO: 6.65 MILLION/UL (ref 3.88–5.62)
SODIUM SERPL-SCNC: 139 MMOL/L (ref 135–147)
SP GR UR STRIP.AUTO: 1.02 (ref 1–1.03)
UROBILINOGEN UR QL STRIP.AUTO: 0.2 E.U./DL
WBC # BLD AUTO: 11.19 THOUSAND/UL (ref 4.31–10.16)

## 2024-05-07 PROCEDURE — 99285 EMERGENCY DEPT VISIT HI MDM: CPT | Performed by: EMERGENCY MEDICINE

## 2024-05-07 PROCEDURE — 81003 URINALYSIS AUTO W/O SCOPE: CPT | Performed by: EMERGENCY MEDICINE

## 2024-05-07 PROCEDURE — 82272 OCCULT BLD FECES 1-3 TESTS: CPT

## 2024-05-07 PROCEDURE — 96365 THER/PROPH/DIAG IV INF INIT: CPT

## 2024-05-07 PROCEDURE — 80053 COMPREHEN METABOLIC PANEL: CPT | Performed by: EMERGENCY MEDICINE

## 2024-05-07 PROCEDURE — 36415 COLL VENOUS BLD VENIPUNCTURE: CPT | Performed by: EMERGENCY MEDICINE

## 2024-05-07 PROCEDURE — 83690 ASSAY OF LIPASE: CPT | Performed by: EMERGENCY MEDICINE

## 2024-05-07 PROCEDURE — 99285 EMERGENCY DEPT VISIT HI MDM: CPT

## 2024-05-07 PROCEDURE — 74177 CT ABD & PELVIS W/CONTRAST: CPT

## 2024-05-07 PROCEDURE — 85025 COMPLETE CBC W/AUTO DIFF WBC: CPT | Performed by: EMERGENCY MEDICINE

## 2024-05-07 RX ADMIN — IOHEXOL 100 ML: 350 INJECTION, SOLUTION INTRAVENOUS at 13:44

## 2024-05-07 RX ADMIN — SODIUM CHLORIDE, SODIUM LACTATE, POTASSIUM CHLORIDE, AND CALCIUM CHLORIDE 1000 ML: .6; .31; .03; .02 INJECTION, SOLUTION INTRAVENOUS at 13:14

## 2024-05-07 NOTE — ED PROVIDER NOTES
History  Chief Complaint   Patient presents with    Abdominal Pain     Mid abdominal pain since Sunday with diarrhea. States that today he noticed blood in his stool with fatigue, intermittent cold feeling in hands and feet. States he takes NSAIDS daily.        History provided by:  Medical records and patient  Abdominal Pain  Pain location:  RUQ  Pain quality: aching and dull    Pain radiates to:  Does not radiate  Pain severity:  Mild  Onset quality:  Gradual  Duration:  2 days  Timing:  Constant  Progression:  Unchanged  Chronicity:  New  Context comment:  Patient with 2 days of nausea vomiting diarrhea and upper abdominal pain.  Noted to have some bright red blood in his stool today.  Relieved by:  Nothing  Worsened by:  Nothing  Ineffective treatments:  None tried  Associated symptoms: diarrhea and hematochezia    Associated symptoms: no anorexia, no belching, no chest pain, no chills, no constipation, no cough, no dysuria, no fatigue, no fever, no flatus, no hematemesis, no hematuria, no melena, no nausea, no shortness of breath, no sore throat and no vomiting        Prior to Admission Medications   Prescriptions Last Dose Informant Patient Reported? Taking?   DULoxetine (CYMBALTA) 60 mg delayed release capsule   Yes No   Sig: Take 60 mg by mouth daily   celecoxib (CeleBREX) 50 MG capsule   Yes No   Sig: Take 50 mg by mouth daily   diclofenac (VOLTAREN) 75 mg EC tablet   Yes No   Sig: Take 75 mg by mouth every 12 (twelve) hours as needed   fenofibrate (TRICOR) 145 mg tablet   Yes No   Sig: Take 145 mg by mouth daily   hydrochlorothiazide (HYDRODIURIL) 25 mg tablet   Yes No   Sig: Take 25 mg by mouth daily   lidocaine (Lidoderm) 5 %   No No   Sig: Apply 1 patch topically over 12 hours daily for 7 days Apply to low back region of pain.  Remove & Discard patch within 12 hours or as directed by MD   methocarbamol (ROBAXIN) 500 mg tablet   No No   Sig: Take 1 tablet (500 mg total) by mouth 2 (two) times a day    naproxen (Naprosyn) 500 mg tablet   No No   Sig: Take 1 tablet (500 mg total) by mouth 2 (two) times a day with meals   pantoprazole (PROTONIX) 40 mg tablet   Yes No   Sig: Take 40 mg by mouth daily   predniSONE 10 mg tablet   No No   Sig: Take 4 tabs daily for thee days then take three tabs daily for three days then take two tablets daily for three days then take one tab daily for three days.   traZODone (DESYREL) 100 mg tablet   Yes No   Sig: Take 100 mg by mouth   valsartan (DIOVAN) 320 MG tablet   Yes No   Sig: Take 320 mg by mouth daily      Facility-Administered Medications: None       Past Medical History:   Diagnosis Date    Hypertension        Past Surgical History:   Procedure Laterality Date    HERNIA REPAIR         History reviewed. No pertinent family history.  I have reviewed and agree with the history as documented.    E-Cigarette/Vaping    E-Cigarette Use Never User      E-Cigarette/Vaping Substances     Social History     Tobacco Use    Smoking status: Never    Smokeless tobacco: Never   Vaping Use    Vaping status: Never Used   Substance Use Topics    Alcohol use: Never    Drug use: Never       Review of Systems   Constitutional:  Negative for appetite change, chills, fatigue and fever.   HENT:  Negative for ear pain, rhinorrhea, sore throat and trouble swallowing.    Eyes:  Negative for pain, discharge and visual disturbance.   Respiratory:  Negative for cough, chest tightness and shortness of breath.    Cardiovascular:  Negative for chest pain and palpitations.   Gastrointestinal:  Positive for abdominal pain, anal bleeding, blood in stool, diarrhea and hematochezia. Negative for anorexia, constipation, flatus, hematemesis, melena, nausea and vomiting.   Endocrine: Negative for polydipsia, polyphagia and polyuria.   Genitourinary:  Negative for difficulty urinating, dysuria, hematuria and testicular pain.   Musculoskeletal:  Negative for arthralgias and back pain.   Skin:  Negative for color  change and rash.   Allergic/Immunologic: Negative for immunocompromised state.   Neurological:  Negative for dizziness, seizures, syncope, weakness and headaches.   Hematological:  Negative for adenopathy.   Psychiatric/Behavioral:  Negative for confusion and dysphoric mood.    All other systems reviewed and are negative.      Physical Exam  Physical Exam  Vitals and nursing note reviewed.   Constitutional:       General: He is not in acute distress.     Appearance: Normal appearance. He is obese. He is not ill-appearing, toxic-appearing or diaphoretic.   HENT:      Head: Normocephalic and atraumatic.      Nose: Nose normal. No congestion or rhinorrhea.      Mouth/Throat:      Mouth: Mucous membranes are moist.      Pharynx: Oropharynx is clear. No oropharyngeal exudate or posterior oropharyngeal erythema.   Eyes:      General:         Right eye: No discharge.         Left eye: No discharge.   Cardiovascular:      Rate and Rhythm: Normal rate and regular rhythm.      Pulses: Normal pulses.      Heart sounds: Normal heart sounds. No murmur heard.     No gallop.   Pulmonary:      Effort: Pulmonary effort is normal. No respiratory distress.      Breath sounds: Normal breath sounds. No stridor. No wheezing, rhonchi or rales.   Chest:      Chest wall: No tenderness.   Abdominal:      General: Bowel sounds are normal. There is no distension.      Palpations: Abdomen is soft. There is no mass.      Tenderness: There is abdominal tenderness. There is no right CVA tenderness, left CVA tenderness, guarding or rebound.      Hernia: No hernia is present.      Comments: Mild inconsistent tenderness to the right upper quadrant   Genitourinary:     Rectum: Normal. Guaiac result negative. No mass or tenderness.      Comments: Light brown stool in rectal vault, guaiac negative.    Musculoskeletal:         General: Normal range of motion.      Cervical back: Normal range of motion and neck supple.   Skin:     General: Skin is warm  and dry.      Capillary Refill: Capillary refill takes less than 2 seconds.   Neurological:      General: No focal deficit present.      Mental Status: He is alert and oriented to person, place, and time.      Cranial Nerves: No cranial nerve deficit.      Sensory: No sensory deficit.      Motor: No weakness.      Coordination: Coordination normal.      Gait: Gait normal.      Deep Tendon Reflexes: Reflexes normal.   Psychiatric:         Mood and Affect: Mood normal.         Behavior: Behavior normal.         Thought Content: Thought content normal.         Judgment: Judgment normal.         Vital Signs  ED Triage Vitals [05/07/24 1304]   Temperature Pulse Respirations Blood Pressure SpO2   (!) 97.3 °F (36.3 °C) 62 20 (!) 179/91 96 %      Temp Source Heart Rate Source Patient Position - Orthostatic VS BP Location FiO2 (%)   Temporal Monitor Sitting Left arm --      Pain Score       5           Vitals:    05/07/24 1315 05/07/24 1330 05/07/24 1400 05/07/24 1415   BP: (!) 178/95  (!) 178/87 (!) 178/91   Pulse: 63 66 63 (!) 52   Patient Position - Orthostatic VS:             Visual Acuity      ED Medications  Medications   lactated ringers bolus 1,000 mL (0 mL Intravenous Stopped 5/7/24 1414)   iohexol (OMNIPAQUE) 350 MG/ML injection (MULTI-DOSE) 100 mL (100 mL Intravenous Given 5/7/24 1344)       Diagnostic Studies  Results Reviewed       Procedure Component Value Units Date/Time    Comprehensive metabolic panel [[id number]] Collected: 05/07/24 1313    Lab Status: Final result Specimen: Blood from Arm, Left Updated: 05/07/24 1338     Sodium 139 mmol/L      Potassium 3.8 mmol/L      Chloride 103 mmol/L      CO2 27 mmol/L      ANION GAP 9 mmol/L      BUN 21 mg/dL      Creatinine 0.96 mg/dL      Glucose 108 mg/dL      Calcium 9.9 mg/dL      AST 29 U/L      ALT 44 U/L      Alkaline Phosphatase 40 U/L      Total Protein 8.2 g/dL      Albumin 4.8 g/dL      Total Bilirubin 0.70 mg/dL      eGFR 99 ml/min/1.73sq m     Narrative:       National Kid[employer] Disease Foundation guidelines for Chronic Kidney Disease (CKD):     Stage 1 with normal or high GFR (GFR > 90 mL/min/1.73 square meters)    Stage 2 Mild CKD (GFR = 60-89 mL/min/1.73 square meters)    Stage 3A Moderate CKD (GFR = 45-59 mL/min/1.73 square meters)    Stage 3B Moderate CKD (GFR = 30-44 mL/min/1.73 square meters)    Stage 4 Severe CKD (GFR = 15-29 mL/min/1.73 square meters)    Stage 5 End Stage CKD (GFR <15 mL/min/1.73 square meters)  Note: GFR calculation is accurate only with a steady state creatinine    Lipase [[id number]]  (Normal) Collected: 05/07/24 1313    Lab Status: Final result Specimen: Blood from Arm, Left Updated: 05/07/24 1338     Lipase 25 u/L     UA w Reflex to Microscopic w Reflex to Culture [[id number]] Collected: 05/07/24 1315    Lab Status: Final result Specimen: Urine, Clean Catch Updated: 05/07/24 1326     Color, UA Yellow     Clarity, UA Clear     Specific Gravity, UA 1.025     pH, UA 6.0     Leukocytes, UA Negative     Nitrite, UA Negative     Protein, UA Negative mg/dl      Glucose, UA Negative mg/dl      Ketones, UA Negative mg/dl      Urobilinogen, UA 0.2 E.U./dl      Bilirubin, UA Negative     Occult Blood, UA Negative    CBC and differential [[id number]]  (Abnormal) Collected: 05/07/24 1313    Lab Status: Final result Specimen: Blood from Arm, Left Updated: 05/07/24 1322     WBC 11.19 Thousand/uL      RBC 6.65 Million/uL      Hemoglobin 13.1 g/dL      Hematocrit 42.9 %      MCV 65 fL      MCH 19.7 pg      MCHC 30.5 g/dL      RDW 17.3 %      MPV 9.8 fL      Platelets 378 Thousands/uL      nRBC 0 /100 WBCs      Segmented % 65 %      Immature Grans % 1 %      Lymphocytes % 25 %      Monocytes % 6 %      Eosinophils Relative 2 %      Basophils Relative 1 %      Absolute Neutrophils 7.35 Thousands/µL      Absolute Immature Grans 0.09 Thousand/uL      Absolute Lymphocytes 2.77 Thousands/µL      Absolute Monocytes 0.63 Thousand/µL      Eosinophils Absolute 0.27  Thousand/µL      Basophils Absolute 0.08 Thousands/µL                    CT abdomen pelvis with contrast   Final Result by Aroldo Neff MD (05/07 1428)      No acute findings in the abdomen or pelvis.         Workstation performed: [id number]                    Procedures  Procedures         ED Course                                             Medical Decision Making  1302: Patient appears well, vital signs reviewed.  Plan to complete basic labs including urinalysis.  Plan to complete CT abdomen pelvis to evaluate right upper quadrant abdominal pain.  Rectal bleeding appears to be self-limiting and due to lower source.    1433: CT and labs reviewed.  Follow up with GI.    Amount and/or Complexity of Data Reviewed  Labs: ordered.  Radiology: ordered and independent interpretation performed.     Details: CT abdomen pelvis-- no acute pathology    Risk  Prescription drug management.             Disposition  Final diagnoses:   Pain of upper abdomen   Gastroenteritis   Rectal bleeding     Time reflects when diagnosis was documented in both MDM as applicable and the Disposition within this note       Time User Action Codes Description Comment    5/7/2024  2:34 PM Christopher Loredo Add [R10.10] Pain of upper abdomen     5/7/2024  2:34 PM Christopher Loredo Add [K52.9] Gastroenteritis     5/7/2024  2:34 PM Christopher Loredo Add [K62.5] Rectal bleeding           ED Disposition       ED Disposition   Discharge    Condition   Stable    Date/Time   Tue May 7, 2024  2:34 PM    Comment   Yusef Mendieta discharge to home/self care.                   Follow-up Information       Follow up With Specialties Details Why Contact Info    Rayna Clemente MD Gastroenterology Schedule an appointment as soon as possible for a visit   [address] [facility] [address]  [phone]              Patient's Medications   Discharge Prescriptions    No medications on file       No discharge procedures on file.    PDMP Review        None            ED Provider  Electronically Signed by             Chrsitopher Loredo MD  05/07/24 0227

## 2024-05-07 NOTE — Clinical Note
Yusef Mendieta was seen and treated in our emergency department on 5/7/2024.                Diagnosis:     Yusef  may return to work on return date.    He may return on this date: 05/08/2024         If you have any questions or concerns, please don't hesitate to call.      Christopher Loredo MD    ______________________________           _______________          _______________  Hospital Representative                              Date                                Time

## 2024-07-22 ENCOUNTER — OFFICE VISIT (OUTPATIENT)
Dept: URGENT CARE | Facility: CLINIC | Age: 39
End: 2024-07-22
Payer: COMMERCIAL

## 2024-07-22 VITALS
OXYGEN SATURATION: 95 % | HEART RATE: 98 BPM | TEMPERATURE: 97.6 F | DIASTOLIC BLOOD PRESSURE: 88 MMHG | HEIGHT: 70 IN | BODY MASS INDEX: 45.1 KG/M2 | WEIGHT: 315 LBS | SYSTOLIC BLOOD PRESSURE: 135 MMHG

## 2024-07-22 DIAGNOSIS — M54.42 ACUTE BILATERAL LOW BACK PAIN WITH LEFT-SIDED SCIATICA: Primary | ICD-10-CM

## 2024-07-22 PROCEDURE — 96372 THER/PROPH/DIAG INJ SC/IM: CPT

## 2024-07-22 PROCEDURE — G0382 LEV 3 HOSP TYPE B ED VISIT: HCPCS

## 2024-07-22 RX ORDER — CYCLOBENZAPRINE HCL 10 MG
10 TABLET ORAL 3 TIMES DAILY PRN
Qty: 10 TABLET | Refills: 0 | Status: SHIPPED | OUTPATIENT
Start: 2024-07-22 | End: 2024-08-04

## 2024-07-22 RX ORDER — KETOROLAC TROMETHAMINE 30 MG/ML
60 INJECTION, SOLUTION INTRAMUSCULAR; INTRAVENOUS ONCE
Status: COMPLETED | OUTPATIENT
Start: 2024-07-22 | End: 2024-07-22

## 2024-07-22 RX ORDER — METHYLPREDNISOLONE 4 MG/1
TABLET ORAL
Qty: 21 EACH | Refills: 0 | Status: SHIPPED | OUTPATIENT
Start: 2024-07-22 | End: 2024-08-04

## 2024-07-22 RX ADMIN — KETOROLAC TROMETHAMINE 60 MG: 30 INJECTION, SOLUTION INTRAMUSCULAR; INTRAVENOUS at 12:47

## 2024-07-22 NOTE — PATIENT INSTRUCTIONS
Take steroid as prescribed  Do not take ibuprofen or other NSAIDs while taking steroid but can take Tylenol as needed for breakthrough pain  Heat as needed  Light stretching and rest   Can take muscle relaxant as prescribed but home use only! No driving or alcohol use after taking  Follow up with PCP in 3-5 days.  Proceed to  ER if symptoms worsen.    If tests are performed, our office will contact you with results only if changes need to made to the care plan discussed with you at the visit. You can review your full results on [facility] My[facility].

## 2024-07-22 NOTE — PROGRESS NOTES
[facility]        NAME: Yusef Mendieta is a 39 y.o. male  : 1985    MRN: [id number]  DATE: 2024  TIME: 1:34 PM    Assessment and Plan   Acute bilateral low back pain with left-sided sciatica [M54.42]  1. Acute bilateral low back pain with left-sided sciatica  methylPREDNISolone 4 MG tablet therapy pack    cyclobenzaprine (FLEXERIL) 10 mg tablet    ketorolac (TORADOL) injection 60 mg        Provided Toradol in office for pain relief. Will also do steroid and muscle relaxant to help with symptoms. Went over side effects including drowsiness of Flexeril. Went over signs and symptoms of when to proceed to ER. Patient verbalized understanding. Patient stable upon discharge.     Patient Instructions     Take steroid as prescribed  Do not take ibuprofen or other NSAIDs while taking steroid but can take Tylenol as needed for breakthrough pain  Heat as needed  Light stretching and rest   Can take muscle relaxant as prescribed but home use only! No driving or alcohol use after taking  Follow up with PCP in 3-5 days.  Proceed to  ER if symptoms worsen.    If tests are performed, our office will contact you with results only if changes need to made to the care plan discussed with you at the visit. [facility]    Chief Complaint     Chief Complaint   Patient presents with    Back Pain     Pt c/o lower back pain starting today after getting off the toilet. Pt took a muscle relaxer and Advil today around 0900. Pt describing it as a sharp pain in his lower back .          History of Present Illness       Back Pain  This is a new problem. The current episode started today (this AM). The pain is present in the lumbar spine (bilaterally). Quality: sharp. Radiates to: L hip slightly. The pain is at a severity of 8/10. Pertinent negatives include no bladder incontinence, bowel incontinence, numbness, paresis, perianal numbness, tingling or weakness. He has tried  muscle relaxant and analgesics (Advil and Flexeril) for the symptoms.       Review of Systems   Review of Systems   Constitutional: Negative.    Respiratory: Negative.     Cardiovascular: Negative.    Gastrointestinal:  Negative for bowel incontinence.   Genitourinary:  Negative for bladder incontinence.   Musculoskeletal:  Positive for back pain (low back).   Neurological:  Negative for tingling, weakness and numbness.         Current Medications       Current Outpatient Medications:     celecoxib (CeleBREX) 50 MG capsule, Take 50 mg by mouth daily, Disp: , Rfl:     cyclobenzaprine (FLEXERIL) 10 mg tablet, Take 1 tablet (10 mg total) by mouth 3 (three) times a day as needed for muscle spasms, Disp: 10 tablet, Rfl: 0    diclofenac (VOLTAREN) 75 mg EC tablet, Take 75 mg by mouth every 12 (twelve) hours as needed, Disp: , Rfl:     DULoxetine (CYMBALTA) 60 mg delayed release capsule, Take 60 mg by mouth daily, Disp: , Rfl:     hydrochlorothiazide (HYDRODIURIL) 25 mg tablet, Take 25 mg by mouth daily, Disp: , Rfl:     methylPREDNISolone 4 MG tablet therapy pack, Use as directed on package, Disp: 21 each, Rfl: 0    naproxen (Naprosyn) 500 mg tablet, Take 1 tablet (500 mg total) by mouth 2 (two) times a day with meals, Disp: 30 tablet, Rfl: 0    pantoprazole (PROTONIX) 40 mg tablet, Take 40 mg by mouth daily, Disp: , Rfl:     traZODone (DESYREL) 100 mg tablet, Take 100 mg by mouth, Disp: , Rfl:     valsartan (DIOVAN) 320 MG tablet, Take 320 mg by mouth daily, Disp: , Rfl:     fenofibrate (TRICOR) 145 mg tablet, Take 145 mg by mouth daily, Disp: , Rfl:     lidocaine (Lidoderm) 5 %, Apply 1 patch topically over 12 hours daily for 7 days Apply to low back region of pain.  Remove & Discard patch within 12 hours or as directed by MD, Disp: 7 patch, Rfl: 0    methocarbamol (ROBAXIN) 500 mg tablet, Take 1 tablet (500 mg total) by mouth 2 (two) times a day (Patient not taking: Reported on 7/22/2024), Disp: 20 tablet, Rfl: 0  No  "current facility-administered medications for this visit.    Current Allergies     Allergies as of 07/22/2024    (No Known Allergies)            The following portions of the patient's history were reviewed and updated as appropriate: allergies, current medications, past family history, past medical history, past social history, past surgical history and problem list.     Past Medical History:   Diagnosis Date    Hypertension        Past Surgical History:   Procedure Laterality Date    HERNIA REPAIR         Family History   Problem Relation Age of Onset    Hypertension Father          Medications have been verified.        Objective   /88   Pulse 98   Temp 97.6 °F (36.4 °C)   Ht 5' 10\" (1.778 m)   Wt (!) 191 kg (421 lb)   SpO2 95%   BMI 60.41 kg/m²        Physical Exam     Physical Exam  Constitutional:       Appearance: Normal appearance.   HENT:      Head: Normocephalic.   Cardiovascular:      Rate and Rhythm: Normal rate and regular rhythm.      Pulses: Normal pulses.      Heart sounds: Normal heart sounds.   Pulmonary:      Effort: Pulmonary effort is normal.      Breath sounds: Normal breath sounds.   Musculoskeletal:         General: Tenderness present. No swelling, deformity or signs of injury.      Comments: Limited back ROM due to pain. TTP at L lumbar paraspinal region with muscle tightness on palpation.   Skin:     General: Skin is warm and dry.   Neurological:      General: No focal deficit present.      Mental Status: He is alert and oriented to person, place, and time. Mental status is at baseline.                   "

## 2024-08-01 ENCOUNTER — APPOINTMENT (EMERGENCY)
Dept: ULTRASOUND IMAGING | Facility: HOSPITAL | Age: 39
End: 2024-08-01

## 2024-08-01 ENCOUNTER — HOSPITAL ENCOUNTER (EMERGENCY)
Facility: HOSPITAL | Age: 39
Discharge: HOME | End: 2024-08-01
Attending: STUDENT IN AN ORGANIZED HEALTH CARE EDUCATION/TRAINING PROGRAM | Admitting: STUDENT IN AN ORGANIZED HEALTH CARE EDUCATION/TRAINING PROGRAM

## 2024-08-01 ENCOUNTER — APPOINTMENT (EMERGENCY)
Dept: CT IMAGING | Facility: HOSPITAL | Age: 39
End: 2024-08-01

## 2024-08-01 ENCOUNTER — APPOINTMENT (EMERGENCY)
Dept: RADIOLOGY | Facility: HOSPITAL | Age: 39
End: 2024-08-01

## 2024-08-01 VITALS
TEMPERATURE: 97.3 F | BODY MASS INDEX: 61.72 KG/M2 | DIASTOLIC BLOOD PRESSURE: 66 MMHG | HEART RATE: 72 BPM | OXYGEN SATURATION: 95 % | RESPIRATION RATE: 20 BRPM | SYSTOLIC BLOOD PRESSURE: 131 MMHG | WEIGHT: 315 LBS

## 2024-08-01 DIAGNOSIS — R19.7 DIARRHEA: ICD-10-CM

## 2024-08-01 DIAGNOSIS — R74.8 ELEVATED LIPASE: ICD-10-CM

## 2024-08-01 DIAGNOSIS — D72.829 LEUKOCYTOSIS: ICD-10-CM

## 2024-08-01 DIAGNOSIS — R10.10 PAIN OF UPPER ABDOMEN: Primary | ICD-10-CM

## 2024-08-01 LAB
ALBUMIN SERPL BCG-MCNC: 4.4 G/DL (ref 3.5–5)
ALP SERPL-CCNC: 36 U/L (ref 34–104)
ALT SERPL W P-5'-P-CCNC: 43 U/L (ref 7–52)
ANION GAP SERPL CALCULATED.3IONS-SCNC: 8 MMOL/L (ref 4–13)
AST SERPL W P-5'-P-CCNC: 31 U/L (ref 13–39)
BASOPHILS # BLD AUTO: 0.08 THOUSANDS/ÂΜL (ref 0–0.1)
BASOPHILS NFR BLD AUTO: 1 % (ref 0–1)
BILIRUB SERPL-MCNC: 0.55 MG/DL (ref 0.2–1)
BILIRUB UR QL STRIP: NEGATIVE
BUN SERPL-MCNC: 26 MG/DL (ref 5–25)
CALCIUM SERPL-MCNC: 9.4 MG/DL (ref 8.4–10.2)
CHLORIDE SERPL-SCNC: 105 MMOL/L (ref 96–108)
CLARITY UR: CLEAR
CO2 SERPL-SCNC: 29 MMOL/L (ref 21–32)
COLOR UR: YELLOW
CREAT SERPL-MCNC: 1.13 MG/DL (ref 0.6–1.3)
EOSINOPHIL # BLD AUTO: 0.29 THOUSAND/ÂΜL (ref 0–0.61)
EOSINOPHIL NFR BLD AUTO: 2 % (ref 0–6)
ERYTHROCYTE [DISTWIDTH] IN BLOOD BY AUTOMATED COUNT: 17.9 % (ref 11.6–15.1)
GFR SERPL CREATININE-BSD FRML MDRD: 80 ML/MIN/1.73SQ M
GLUCOSE SERPL-MCNC: 81 MG/DL (ref 65–140)
GLUCOSE UR STRIP-MCNC: NEGATIVE MG/DL
HCT VFR BLD AUTO: 43.4 % (ref 36.5–49.3)
HGB BLD-MCNC: 13.1 G/DL (ref 12–17)
HGB UR QL STRIP.AUTO: NEGATIVE
IMM GRANULOCYTES # BLD AUTO: 0.33 THOUSAND/UL (ref 0–0.2)
IMM GRANULOCYTES NFR BLD AUTO: 2 % (ref 0–2)
KETONES UR STRIP-MCNC: NEGATIVE MG/DL
LACTATE SERPL-SCNC: 1 MMOL/L (ref 0.5–2)
LEUKOCYTE ESTERASE UR QL STRIP: NEGATIVE
LIPASE SERPL-CCNC: 329 U/L (ref 11–82)
LYMPHOCYTES # BLD AUTO: 4.66 THOUSANDS/ÂΜL (ref 0.6–4.47)
LYMPHOCYTES NFR BLD AUTO: 27 % (ref 14–44)
MCH RBC QN AUTO: 19.3 PG (ref 26.8–34.3)
MCHC RBC AUTO-ENTMCNC: 30.2 G/DL (ref 31.4–37.4)
MCV RBC AUTO: 64 FL (ref 82–98)
MONOCYTES # BLD AUTO: 1.06 THOUSAND/ÂΜL (ref 0.17–1.22)
MONOCYTES NFR BLD AUTO: 6 % (ref 4–12)
NEUTROPHILS # BLD AUTO: 10.7 THOUSANDS/ÂΜL (ref 1.85–7.62)
NEUTS SEG NFR BLD AUTO: 62 % (ref 43–75)
NITRITE UR QL STRIP: NEGATIVE
NRBC BLD AUTO-RTO: 0 /100 WBCS
PH UR STRIP.AUTO: 5.5 [PH]
PLATELET # BLD AUTO: 421 THOUSANDS/UL (ref 149–390)
PMV BLD AUTO: 9.7 FL (ref 8.9–12.7)
POTASSIUM SERPL-SCNC: 3.7 MMOL/L (ref 3.5–5.3)
PROT SERPL-MCNC: 7.3 G/DL (ref 6.4–8.4)
PROT UR STRIP-MCNC: NEGATIVE MG/DL
RBC # BLD AUTO: 6.78 MILLION/UL (ref 3.88–5.62)
SODIUM SERPL-SCNC: 142 MMOL/L (ref 135–147)
SP GR UR STRIP.AUTO: 1.02 (ref 1–1.03)
UROBILINOGEN UR QL STRIP.AUTO: 0.2 E.U./DL
WBC # BLD AUTO: 17.12 THOUSAND/UL (ref 4.31–10.16)

## 2024-08-01 PROCEDURE — 71045 X-RAY EXAM CHEST 1 VIEW: CPT

## 2024-08-01 PROCEDURE — 99285 EMERGENCY DEPT VISIT HI MDM: CPT | Performed by: PHYSICIAN ASSISTANT

## 2024-08-01 PROCEDURE — 96375 TX/PRO/DX INJ NEW DRUG ADDON: CPT

## 2024-08-01 PROCEDURE — 85025 COMPLETE CBC W/AUTO DIFF WBC: CPT | Performed by: PHYSICIAN ASSISTANT

## 2024-08-01 PROCEDURE — 80053 COMPREHEN METABOLIC PANEL: CPT | Performed by: PHYSICIAN ASSISTANT

## 2024-08-01 PROCEDURE — 81003 URINALYSIS AUTO W/O SCOPE: CPT | Performed by: PHYSICIAN ASSISTANT

## 2024-08-01 PROCEDURE — 83690 ASSAY OF LIPASE: CPT | Performed by: PHYSICIAN ASSISTANT

## 2024-08-01 PROCEDURE — 99284 EMERGENCY DEPT VISIT MOD MDM: CPT

## 2024-08-01 PROCEDURE — 74177 CT ABD & PELVIS W/CONTRAST: CPT

## 2024-08-01 PROCEDURE — 96374 THER/PROPH/DIAG INJ IV PUSH: CPT

## 2024-08-01 PROCEDURE — 36415 COLL VENOUS BLD VENIPUNCTURE: CPT | Performed by: PHYSICIAN ASSISTANT

## 2024-08-01 PROCEDURE — 83605 ASSAY OF LACTIC ACID: CPT | Performed by: PHYSICIAN ASSISTANT

## 2024-08-01 PROCEDURE — 76705 ECHO EXAM OF ABDOMEN: CPT

## 2024-08-01 PROCEDURE — 96361 HYDRATE IV INFUSION ADD-ON: CPT

## 2024-08-01 RX ORDER — FAMOTIDINE 20 MG/1
20 TABLET, FILM COATED ORAL 2 TIMES DAILY
Qty: 28 TABLET | Refills: 0 | Status: SHIPPED | OUTPATIENT
Start: 2024-08-01 | End: 2024-08-04

## 2024-08-01 RX ORDER — DICYCLOMINE HCL 20 MG
20 TABLET ORAL ONCE
Status: COMPLETED | OUTPATIENT
Start: 2024-08-01 | End: 2024-08-01

## 2024-08-01 RX ORDER — HYDROMORPHONE HCL/PF 1 MG/ML
1 SYRINGE (ML) INJECTION ONCE
Status: COMPLETED | OUTPATIENT
Start: 2024-08-01 | End: 2024-08-01

## 2024-08-01 RX ORDER — KETOROLAC TROMETHAMINE 30 MG/ML
15 INJECTION, SOLUTION INTRAMUSCULAR; INTRAVENOUS ONCE
Status: COMPLETED | OUTPATIENT
Start: 2024-08-01 | End: 2024-08-01

## 2024-08-01 RX ORDER — DICYCLOMINE HCL 20 MG
20 TABLET ORAL 2 TIMES DAILY
Qty: 28 TABLET | Refills: 0 | Status: SHIPPED | OUTPATIENT
Start: 2024-08-01 | End: 2024-08-04

## 2024-08-01 RX ORDER — SUCRALFATE 1 G/1
1 TABLET ORAL 4 TIMES DAILY
Qty: 56 TABLET | Refills: 0 | Status: SHIPPED | OUTPATIENT
Start: 2024-08-01 | End: 2024-08-15

## 2024-08-01 RX ORDER — ONDANSETRON 4 MG/1
4 TABLET, FILM COATED ORAL EVERY 6 HOURS
Qty: 12 TABLET | Refills: 0 | Status: SHIPPED | OUTPATIENT
Start: 2024-08-01

## 2024-08-01 RX ORDER — MAGNESIUM HYDROXIDE/ALUMINUM HYDROXICE/SIMETHICONE 120; 1200; 1200 MG/30ML; MG/30ML; MG/30ML
30 SUSPENSION ORAL ONCE
Status: COMPLETED | OUTPATIENT
Start: 2024-08-01 | End: 2024-08-01

## 2024-08-01 RX ORDER — SUCRALFATE 1 G/1
1 TABLET ORAL ONCE
Status: COMPLETED | OUTPATIENT
Start: 2024-08-01 | End: 2024-08-01

## 2024-08-01 RX ORDER — PANTOPRAZOLE SODIUM 40 MG/10ML
40 INJECTION, POWDER, LYOPHILIZED, FOR SOLUTION INTRAVENOUS ONCE
Status: COMPLETED | OUTPATIENT
Start: 2024-08-01 | End: 2024-08-01

## 2024-08-01 RX ADMIN — IOHEXOL 100 ML: 350 INJECTION, SOLUTION INTRAVENOUS at 17:13

## 2024-08-01 RX ADMIN — SUCRALFATE 1 G: 1 TABLET ORAL at 20:59

## 2024-08-01 RX ADMIN — HYDROMORPHONE HYDROCHLORIDE 1 MG: 1 INJECTION, SOLUTION INTRAMUSCULAR; INTRAVENOUS; SUBCUTANEOUS at 19:49

## 2024-08-01 RX ADMIN — DICYCLOMINE HYDROCHLORIDE 20 MG: 20 TABLET ORAL at 20:59

## 2024-08-01 RX ADMIN — HYDROMORPHONE HYDROCHLORIDE 1 MG: 1 INJECTION, SOLUTION INTRAMUSCULAR; INTRAVENOUS; SUBCUTANEOUS at 19:43

## 2024-08-01 RX ADMIN — PANTOPRAZOLE SODIUM 40 MG: 40 INJECTION, POWDER, FOR SOLUTION INTRAVENOUS at 16:10

## 2024-08-01 RX ADMIN — SODIUM CHLORIDE 1000 ML: 0.9 INJECTION, SOLUTION INTRAVENOUS at 16:05

## 2024-08-01 RX ADMIN — KETOROLAC TROMETHAMINE 15 MG: 30 INJECTION, SOLUTION INTRAMUSCULAR; INTRAVENOUS at 16:10

## 2024-08-01 RX ADMIN — ALUMINUM HYDROXIDE, MAGNESIUM HYDROXIDE, AND DIMETHICONE 30 ML: 200; 20; 200 SUSPENSION ORAL at 20:59

## 2024-08-01 NOTE — ED PROVIDER NOTES
History  Chief Complaint   Patient presents with    Abdominal Pain     Pt reports upper abd pain x2 days. Denies n/v. +diarrhea.      Patient is a 39-year-old male who presents with a complaint of upper abdominal pain over the last 3 days.  The patient states that he feels bloated and has decreased p.o. intake.  Pain is worse with eating.  Has had send diagnosis of COVID but completed Paxlovid.  Does not have cough congestion.  Pain is in the upper abdomen and radiates to the back.  Denies any alcohol use.  Is any vomiting or nausea.  Patient admits to diarrhea.          Prior to Admission Medications   Prescriptions Last Dose Informant Patient Reported? Taking?   DULoxetine (CYMBALTA) 60 mg delayed release capsule   Yes No   Sig: Take 60 mg by mouth daily   celecoxib (CeleBREX) 50 MG capsule   Yes No   Sig: Take 50 mg by mouth daily   cyclobenzaprine (FLEXERIL) 10 mg tablet   No No   Sig: Take 1 tablet (10 mg total) by mouth 3 (three) times a day as needed for muscle spasms   diclofenac (VOLTAREN) 75 mg EC tablet   Yes No   Sig: Take 75 mg by mouth every 12 (twelve) hours as needed   fenofibrate (TRICOR) 145 mg tablet   Yes No   Sig: Take 145 mg by mouth daily   hydrochlorothiazide (HYDRODIURIL) 25 mg tablet   Yes No   Sig: Take 25 mg by mouth daily   lidocaine (Lidoderm) 5 %   No No   Sig: Apply 1 patch topically over 12 hours daily for 7 days Apply to low back region of pain.  Remove & Discard patch within 12 hours or as directed by MD   methocarbamol (ROBAXIN) 500 mg tablet   No No   Sig: Take 1 tablet (500 mg total) by mouth 2 (two) times a day   Patient not taking: Reported on 7/22/2024   methylPREDNISolone 4 MG tablet therapy pack   No No   Sig: Use as directed on package   naproxen (Naprosyn) 500 mg tablet   No No   Sig: Take 1 tablet (500 mg total) by mouth 2 (two) times a day with meals   pantoprazole (PROTONIX) 40 mg tablet   Yes No   Sig: Take 40 mg by mouth daily   traZODone (DESYREL) 100 mg tablet    Yes No   Sig: Take 100 mg by mouth   valsartan (DIOVAN) 320 MG tablet   Yes No   Sig: Take 320 mg by mouth daily      Facility-Administered Medications: None       Past Medical History:   Diagnosis Date    Hypertension        Past Surgical History:   Procedure Laterality Date    HERNIA REPAIR         Family History   Problem Relation Age of Onset    Hypertension Father      I have reviewed and agree with the history as documented.    E-Cigarette/Vaping    E-Cigarette Use Never User      E-Cigarette/Vaping Substances    Nicotine No     THC No     CBD No     Flavoring No     Other No     Unknown No      Social History     Tobacco Use    Smoking status: Never    Smokeless tobacco: Never   Vaping Use    Vaping status: Never Used   Substance Use Topics    Alcohol use: Yes     Comment: occasionally    Drug use: Never       Review of Systems   All other systems reviewed and are negative.      Physical Exam  Physical Exam  Vitals and nursing note reviewed.   Constitutional:       General: He is in acute distress.      Appearance: He is well-developed.   HENT:      Head: Normocephalic and atraumatic.      Mouth/Throat:      Mouth: Oropharynx is clear and moist.   Eyes:      Extraocular Movements: Extraocular movements intact and EOM normal.      Pupils: Pupils are equal, round, and reactive to light.   Cardiovascular:      Rate and Rhythm: Normal rate and regular rhythm.      Heart sounds: Normal heart sounds. No murmur heard.  Pulmonary:      Effort: Pulmonary effort is normal. No respiratory distress.      Breath sounds: Normal breath sounds.   Abdominal:      General: Bowel sounds are normal.      Palpations: Abdomen is soft.      Tenderness: There is abdominal tenderness in the right upper quadrant and epigastric area. There is no right CVA tenderness, left CVA tenderness, guarding or rebound. Negative signs include Rovsing's sign.      Hernia: No hernia is present.   Musculoskeletal:      Cervical back: Normal range of  motion.   Skin:     General: Skin is warm and dry.      Capillary Refill: Capillary refill takes less than 2 seconds.   Neurological:      General: No focal deficit present.      Mental Status: He is alert and oriented to person, place, and time.   Psychiatric:         Mood and Affect: Mood and affect normal.         Behavior: Behavior normal.         Vital Signs  ED Triage Vitals   Temperature Pulse Respirations Blood Pressure SpO2   08/01/24 1536 08/01/24 1536 08/01/24 1536 08/01/24 1536 08/01/24 1536   (!) 97.3 °F (36.3 °C) 89 18 140/80 98 %      Temp Source Heart Rate Source Patient Position - Orthostatic VS BP Location FiO2 (%)   08/01/24 1536 08/01/24 1536 08/01/24 1536 08/01/24 1536 --   Temporal Monitor Sitting Left arm       Pain Score       08/01/24 1610       8           Vitals:    08/01/24 1630 08/01/24 1816 08/01/24 1944 08/01/24 2002   BP: 168/72 157/74 158/70 131/66   Pulse: 75 92 63 72   Patient Position - Orthostatic VS:  Lying Lying Lying         Visual Acuity      ED Medications  Medications   sucralfate (CARAFATE) tablet 1 g (has no administration in time range)   aluminum-magnesium hydroxide-simethicone (MAALOX) oral suspension 30 mL (has no administration in time range)   dicyclomine (BENTYL) tablet 20 mg (has no administration in time range)   sodium chloride 0.9 % bolus 1,000 mL (0 mL Intravenous Stopped 8/1/24 1815)   pantoprazole (PROTONIX) injection 40 mg (40 mg Intravenous Given 8/1/24 1610)   ketorolac (TORADOL) injection 15 mg (15 mg Intravenous Given 8/1/24 1610)   iohexol (OMNIPAQUE) 350 MG/ML injection (MULTI-DOSE) 100 mL (100 mL Intravenous Given 8/1/24 1713)   HYDROmorphone (DILAUDID) injection 1 mg (1 mg Intravenous Given 8/1/24 1943)   HYDROmorphone (DILAUDID) injection 1 mg (1 mg Intravenous Given 8/1/24 1949)       Diagnostic Studies  Results Reviewed       Procedure Component Value Units Date/Time    Comprehensive metabolic panel [[id number]]  (Abnormal) Collected: 08/01/24  1604    Lab Status: Final result Specimen: Blood from Arm, Right Updated: 08/01/24 1641     Sodium 142 mmol/L      Potassium 3.7 mmol/L      Chloride 105 mmol/L      CO2 29 mmol/L      ANION GAP 8 mmol/L      BUN 26 mg/dL      Creatinine 1.13 mg/dL      Glucose 81 mg/dL      Calcium 9.4 mg/dL      AST 31 U/L      ALT 43 U/L      Alkaline Phosphatase 36 U/L      Total Protein 7.3 g/dL      Albumin 4.4 g/dL      Total Bilirubin 0.55 mg/dL      eGFR 80 ml/min/1.73sq m     Narrative:      National Kid[employer] Foundation guidelines for Chronic Kidney Disease (CKD):     Stage 1 with normal or high GFR (GFR > 90 mL/min/1.73 square meters)    Stage 2 Mild CKD (GFR = 60-89 mL/min/1.73 square meters)    Stage 3A Moderate CKD (GFR = 45-59 mL/min/1.73 square meters)    Stage 3B Moderate CKD (GFR = 30-44 mL/min/1.73 square meters)    Stage 4 Severe CKD (GFR = 15-29 mL/min/1.73 square meters)    Stage 5 End Stage CKD (GFR <15 mL/min/1.73 square meters)  Note: GFR calculation is accurate only with a steady state creatinine    Lipase [[id number]]  (Abnormal) Collected: 08/01/24 1604    Lab Status: Final result Specimen: Blood from Arm, Right Updated: 08/01/24 1641     Lipase 329 u/L     Lactic acid, plasma (w/reflex if result > 2.0) [[id number]]  (Normal) Collected: 08/01/24 1604    Lab Status: Final result Specimen: Blood from Arm, Right Updated: 08/01/24 1640     LACTIC ACID 1.0 mmol/L     Narrative:      Result may be elevated if tourniquet was used during collection.    UA w Reflex to Microscopic w Reflex to Culture [[id number]] Collected: 08/01/24 1629    Lab Status: Final result Specimen: Urine, Clean Catch Updated: 08/01/24 1637     Color, UA Yellow     Clarity, UA Clear     Specific Gravity, UA 1.025     pH, UA 5.5     Leukocytes, UA Negative     Nitrite, UA Negative     Protein, UA Negative mg/dl      Glucose, UA Negative mg/dl      Ketones, UA Negative mg/dl      Urobilinogen, UA 0.2 E.U./dl      Bilirubin, UA Negative      Occult Blood, UA Negative    CBC and differential [[id number]]  (Abnormal) Collected: 08/01/24 1604    Lab Status: Final result Specimen: Blood from Arm, Right Updated: 08/01/24 1611     WBC 17.12 Thousand/uL      RBC 6.78 Million/uL      Hemoglobin 13.1 g/dL      Hematocrit 43.4 %      MCV 64 fL      MCH 19.3 pg      MCHC 30.2 g/dL      RDW 17.9 %      MPV 9.7 fL      Platelets 421 Thousands/uL      nRBC 0 /100 WBCs      Segmented % 62 %      Immature Grans % 2 %      Lymphocytes % 27 %      Monocytes % 6 %      Eosinophils Relative 2 %      Basophils Relative 1 %      Absolute Neutrophils 10.70 Thousands/µL      Absolute Immature Grans 0.33 Thousand/uL      Absolute Lymphocytes 4.66 Thousands/µL      Absolute Monocytes 1.06 Thousand/µL      Eosinophils Absolute 0.29 Thousand/µL      Basophils Absolute 0.08 Thousands/µL                    US right upper quadrant   Final Result by Ulisses Shen DO (08/01 2004)   Significantly enlarged fatty liver with relative sparing adjacent to the gallbladder fossa.   Unremarkable gallbladder and biliary ducts.      Pancreas and retroperitoneal structures not well seen.   Unremarkable right kidney.      Workstation performed: [id number]         CT abdomen pelvis with contrast   Final Result by Vlad Judd MD (08/01 1746)      No acute findings in the abdomen and pelvis.         Workstation performed: [id number]         XR chest 1 view portable   Final Result by Denver Burnson MD (08/01 2023)      No acute cardiopulmonary disease.            Workstation performed: [id number]                    Procedures  Procedures         ED Course  ED Course as of 08/01/24 2053   u Aug 01, 2024   1627 WBC(!): 17.12   1700 LIPASE(!): 329   1816 Pain is still a 6/10 , reaching out to Dr. Hollis with general surgery about condition    2008 U/s is back so reaching out to Dr. Hollis with surgery again    2044 Denys with Dr. Hollis from surgery and felt the patient should be observed  under the medicine service, frequent belly exams treating with Protonix or GI cocktail at this time no surgical findings but due to the lab work and patient's pain felt the patient should be observed at this time.  The patient did have some pain relief with the Dilaudid but unwilling to be observed in the hospital at this time due to lack of insurance.  Patient educated on the ultrasound and CT scan findings and lab work.                                   SBIRT 22yo+      Flowsheet Row Most Recent Value   Initial Alcohol Screen: US AUDIT-C     1. How often do you have a drink containing alcohol? 0 Filed at: 08/01/2024 1539   2. How many drinks containing alcohol do you have on a typical day you are drinking?  0 Filed at: 08/01/2024 1539   3a. Male UNDER 65: How often do you have five or more drinks on one occasion? 0 Filed at: 08/01/2024 1539   Audit-C Score 0 Filed at: 08/01/2024 1539   ALEXANDER: How many times in the past year have you...    Used an illegal drug or used a prescription medication for non-medical reasons? Never Filed at: 08/01/2024 1539                      Medical Decision Making  Patient is a 39-year-old male who presents with a complaint of upper abdominal pain over the last 3 days.  The patient states that he feels bloated and has decreased p.o. intake.  Pain is worse with eating.  Has had send diagnosis of COVID but completed Paxlovid.  Does not have cough congestion.  Pain is in the upper abdomen and radiates to the back.  Denies any alcohol use.  Is any vomiting or nausea.  Patient admits to diarrhea    Completed a Z-Humza, Paxlovid and a Medrol Dosepak last week with COVID infection    Patient's lab work showed leukocytosis and elevated lipase.  Patient is on Ozempic.  The patient had abdominal pain worse in the right upper quadrant epigastrium.  Patient condition concerning for gastritis versus peptic ulcer disease versus early acute cholecystitis versus pancreatitis.    Patient's condition was  discussed with general surgeon, Dr. Hollis and at this time he felt in agreement the patient should be observed inpatient on medicine service for pain control and observation.  The patient's condition very well could be gastritis versus peptic ulcer disease or gastroenteritis but at this time due to the patient's pain would err on the side of caution and observe him for concerns of early infectious etiology.  Patient did have pain improvement with the Dilaudid.  Patient was offered observation and declined.  Would prefer outpatient treatment at this time will return if anything worsens did educate on the concerns as above and patient expressed understanding spouse was at bedside and also expressed understanding discharged home with medications and told to return immediately if anything changes or worsens.    Amount and/or Complexity of Data Reviewed  Labs: ordered. Decision-making details documented in ED Course.  Radiology: ordered and independent interpretation performed. Decision-making details documented in ED Course.    Risk  Prescription drug management.                 Disposition  Final diagnoses:   Pain of upper abdomen   Leukocytosis   Elevated lipase   Diarrhea     Time reflects when diagnosis was documented in both MDM as applicable and the Disposition within this note       Time User Action Codes Description Comment    8/1/2024  8:46 PM Onesimo Paniagua [R10.10] Pain of upper abdomen     8/1/2024  8:46 PM Onesimo Paniagua [D72.829] Leukocytosis     8/1/2024  8:46 PM Onesimo Paniagua [R74.8] Elevated lipase     8/1/2024  8:46 PM Onesimo Paniagua [R19.7] Diarrhea           ED Disposition       ED Disposition   Discharge    Condition   Stable    Date/Time   Thu Aug 1, 2024 2046    Comment   Yusef Mendieta discharge to home/self care.                   Follow-up Information    None         Patient's Medications   Discharge Prescriptions    DICYCLOMINE (BENTYL) 20 MG TABLET    Take 1 tablet (20 mg  total) by mouth 2 (two) times a day for 14 days       Start Date: 8/1/2024  End Date: 8/15/2024       Order Dose: 20 mg       Quantity: 28 tablet    Refills: 0    FAMOTIDINE (PEPCID) 20 MG TABLET    Take 1 tablet (20 mg total) by mouth 2 (two) times a day for 14 days       Start Date: 8/1/2024  End Date: 8/15/2024       Order Dose: 20 mg       Quantity: 28 tablet    Refills: 0    ONDANSETRON (ZOFRAN) 4 MG TABLET    Take 1 tablet (4 mg total) by mouth every 6 (six) hours       Start Date: 8/1/2024  End Date: --       Order Dose: 4 mg       Quantity: 12 tablet    Refills: 0    SUCRALFATE (CARAFATE) 1 G TABLET    Take 1 tablet (1 g total) by mouth 4 (four) times a day for 14 days       Start Date: 8/1/2024  End Date: 8/15/2024       Order Dose: 1 g       Quantity: 56 tablet    Refills: 0       No discharge procedures on file.    PDMP Review       None            ED Provider  Electronically Signed by             Onesimo Paniagua PA-C  08/01/24 2053

## 2024-08-02 ENCOUNTER — HOSPITAL ENCOUNTER (INPATIENT)
Facility: HOSPITAL | Age: 39
LOS: 1 days | Discharge: HOME | DRG: 282 | End: 2024-08-04
Attending: EMERGENCY MEDICINE | Admitting: INTERNAL MEDICINE
Payer: COMMERCIAL

## 2024-08-02 DIAGNOSIS — K85.90 PANCREATITIS: Primary | ICD-10-CM

## 2024-08-02 LAB
ALBUMIN SERPL BCG-MCNC: 4.4 G/DL (ref 3.5–5)
ALP SERPL-CCNC: 42 U/L (ref 34–104)
ALT SERPL W P-5'-P-CCNC: 47 U/L (ref 7–52)
ANION GAP SERPL CALCULATED.3IONS-SCNC: 8 MMOL/L (ref 4–13)
AST SERPL W P-5'-P-CCNC: 27 U/L (ref 13–39)
BASOPHILS # BLD AUTO: 0.06 THOUSANDS/ÂΜL (ref 0–0.1)
BASOPHILS NFR BLD AUTO: 0 % (ref 0–1)
BILIRUB SERPL-MCNC: 0.79 MG/DL (ref 0.2–1)
BILIRUB UR QL STRIP: NEGATIVE
BUN SERPL-MCNC: 18 MG/DL (ref 5–25)
CALCIUM SERPL-MCNC: 9.2 MG/DL (ref 8.4–10.2)
CHLORIDE SERPL-SCNC: 100 MMOL/L (ref 96–108)
CLARITY UR: CLEAR
CO2 SERPL-SCNC: 29 MMOL/L (ref 21–32)
COLOR UR: YELLOW
CREAT SERPL-MCNC: 0.98 MG/DL (ref 0.6–1.3)
EOSINOPHIL # BLD AUTO: 0.35 THOUSAND/ÂΜL (ref 0–0.61)
EOSINOPHIL NFR BLD AUTO: 3 % (ref 0–6)
ERYTHROCYTE [DISTWIDTH] IN BLOOD BY AUTOMATED COUNT: 17.5 % (ref 11.6–15.1)
GFR SERPL CREATININE-BSD FRML MDRD: 96 ML/MIN/1.73SQ M
GLUCOSE SERPL-MCNC: 88 MG/DL (ref 65–140)
GLUCOSE UR STRIP-MCNC: NEGATIVE MG/DL
HCT VFR BLD AUTO: 42 % (ref 36.5–49.3)
HGB BLD-MCNC: 12.8 G/DL (ref 12–17)
HGB UR QL STRIP.AUTO: NEGATIVE
IMM GRANULOCYTES # BLD AUTO: 0.2 THOUSAND/UL (ref 0–0.2)
IMM GRANULOCYTES NFR BLD AUTO: 1 % (ref 0–2)
KETONES UR STRIP-MCNC: NEGATIVE MG/DL
LACTATE SERPL-SCNC: 0.9 MMOL/L (ref 0.5–2)
LEUKOCYTE ESTERASE UR QL STRIP: NEGATIVE
LIPASE SERPL-CCNC: 373 U/L (ref 11–82)
LYMPHOCYTES # BLD AUTO: 3.46 THOUSANDS/ÂΜL (ref 0.6–4.47)
LYMPHOCYTES NFR BLD AUTO: 25 % (ref 14–44)
MCH RBC QN AUTO: 19.4 PG (ref 26.8–34.3)
MCHC RBC AUTO-ENTMCNC: 30.5 G/DL (ref 31.4–37.4)
MCV RBC AUTO: 64 FL (ref 82–98)
MONOCYTES # BLD AUTO: 0.69 THOUSAND/ÂΜL (ref 0.17–1.22)
MONOCYTES NFR BLD AUTO: 5 % (ref 4–12)
NEUTROPHILS # BLD AUTO: 9.19 THOUSANDS/ÂΜL (ref 1.85–7.62)
NEUTS SEG NFR BLD AUTO: 66 % (ref 43–75)
NITRITE UR QL STRIP: NEGATIVE
NRBC BLD AUTO-RTO: 0 /100 WBCS
PH UR STRIP.AUTO: 6 [PH]
PLATELET # BLD AUTO: 379 THOUSANDS/UL (ref 149–390)
PMV BLD AUTO: 9.3 FL (ref 8.9–12.7)
POTASSIUM SERPL-SCNC: 3.8 MMOL/L (ref 3.5–5.3)
PROT SERPL-MCNC: 7.4 G/DL (ref 6.4–8.4)
PROT UR STRIP-MCNC: NEGATIVE MG/DL
RBC # BLD AUTO: 6.59 MILLION/UL (ref 3.88–5.62)
SODIUM SERPL-SCNC: 137 MMOL/L (ref 135–147)
SP GR UR STRIP.AUTO: 1.02 (ref 1–1.03)
UROBILINOGEN UR QL STRIP.AUTO: 1 E.U./DL
WBC # BLD AUTO: 13.95 THOUSAND/UL (ref 4.31–10.16)

## 2024-08-02 PROCEDURE — 80053 COMPREHEN METABOLIC PANEL: CPT | Performed by: EMERGENCY MEDICINE

## 2024-08-02 PROCEDURE — 96374 THER/PROPH/DIAG INJ IV PUSH: CPT

## 2024-08-02 PROCEDURE — 36415 COLL VENOUS BLD VENIPUNCTURE: CPT | Performed by: EMERGENCY MEDICINE

## 2024-08-02 PROCEDURE — 81003 URINALYSIS AUTO W/O SCOPE: CPT | Performed by: EMERGENCY MEDICINE

## 2024-08-02 PROCEDURE — 99284 EMERGENCY DEPT VISIT MOD MDM: CPT

## 2024-08-02 PROCEDURE — 83690 ASSAY OF LIPASE: CPT | Performed by: EMERGENCY MEDICINE

## 2024-08-02 PROCEDURE — 85025 COMPLETE CBC W/AUTO DIFF WBC: CPT | Performed by: EMERGENCY MEDICINE

## 2024-08-02 PROCEDURE — 83605 ASSAY OF LACTIC ACID: CPT | Performed by: EMERGENCY MEDICINE

## 2024-08-02 PROCEDURE — 99223 1ST HOSP IP/OBS HIGH 75: CPT | Performed by: NURSE PRACTITIONER

## 2024-08-02 PROCEDURE — 99285 EMERGENCY DEPT VISIT HI MDM: CPT | Performed by: EMERGENCY MEDICINE

## 2024-08-02 PROCEDURE — 96361 HYDRATE IV INFUSION ADD-ON: CPT

## 2024-08-02 RX ORDER — OXYCODONE HYDROCHLORIDE 5 MG/1
5 TABLET ORAL EVERY 6 HOURS PRN
Status: DISCONTINUED | OUTPATIENT
Start: 2024-08-02 | End: 2024-08-04 | Stop reason: HOSPADM

## 2024-08-02 RX ORDER — HYDROMORPHONE HCL/PF 1 MG/ML
0.5 SYRINGE (ML) INJECTION
Status: DISCONTINUED | OUTPATIENT
Start: 2024-08-02 | End: 2024-08-04 | Stop reason: HOSPADM

## 2024-08-02 RX ORDER — HYDROMORPHONE HCL/PF 1 MG/ML
1 SYRINGE (ML) INJECTION ONCE
Status: COMPLETED | OUTPATIENT
Start: 2024-08-02 | End: 2024-08-02

## 2024-08-02 RX ORDER — ACETAMINOPHEN 325 MG/1
975 TABLET ORAL EVERY 8 HOURS PRN
Status: DISCONTINUED | OUTPATIENT
Start: 2024-08-02 | End: 2024-08-04 | Stop reason: HOSPADM

## 2024-08-02 RX ORDER — DULOXETIN HYDROCHLORIDE 60 MG/1
60 CAPSULE, DELAYED RELEASE ORAL DAILY
Status: DISCONTINUED | OUTPATIENT
Start: 2024-08-03 | End: 2024-08-04 | Stop reason: HOSPADM

## 2024-08-02 RX ORDER — PANTOPRAZOLE SODIUM 40 MG/10ML
40 INJECTION, POWDER, LYOPHILIZED, FOR SOLUTION INTRAVENOUS
Status: DISCONTINUED | OUTPATIENT
Start: 2024-08-03 | End: 2024-08-04 | Stop reason: HOSPADM

## 2024-08-02 RX ORDER — TRAZODONE HYDROCHLORIDE 100 MG/1
100 TABLET ORAL
Status: DISCONTINUED | OUTPATIENT
Start: 2024-08-03 | End: 2024-08-04 | Stop reason: HOSPADM

## 2024-08-02 RX ORDER — ENOXAPARIN SODIUM 100 MG/ML
40 INJECTION SUBCUTANEOUS DAILY
Status: DISCONTINUED | OUTPATIENT
Start: 2024-08-03 | End: 2024-08-04 | Stop reason: HOSPADM

## 2024-08-02 RX ORDER — SODIUM CHLORIDE 9 MG/ML
75 INJECTION, SOLUTION INTRAVENOUS CONTINUOUS
Status: DISCONTINUED | OUTPATIENT
Start: 2024-08-02 | End: 2024-08-04

## 2024-08-02 RX ORDER — LOSARTAN POTASSIUM 50 MG/1
100 TABLET ORAL DAILY
Status: DISCONTINUED | OUTPATIENT
Start: 2024-08-03 | End: 2024-08-04 | Stop reason: HOSPADM

## 2024-08-02 RX ORDER — HYDROMORPHONE HCL/PF 1 MG/ML
0.5 SYRINGE (ML) INJECTION
Status: DISCONTINUED | OUTPATIENT
Start: 2024-08-02 | End: 2024-08-03

## 2024-08-02 RX ORDER — FENOFIBRATE 145 MG/1
145 TABLET, COATED ORAL DAILY
Status: DISCONTINUED | OUTPATIENT
Start: 2024-08-03 | End: 2024-08-04 | Stop reason: HOSPADM

## 2024-08-02 RX ORDER — HYDROMORPHONE HCL/PF 1 MG/ML
0.5 SYRINGE (ML) INJECTION ONCE
Status: COMPLETED | OUTPATIENT
Start: 2024-08-02 | End: 2024-08-02

## 2024-08-02 RX ADMIN — HYDROMORPHONE HYDROCHLORIDE 0.5 MG: 1 INJECTION, SOLUTION INTRAMUSCULAR; INTRAVENOUS; SUBCUTANEOUS at 21:39

## 2024-08-02 RX ADMIN — SODIUM CHLORIDE 1000 ML: 0.9 INJECTION, SOLUTION INTRAVENOUS at 21:29

## 2024-08-02 RX ADMIN — HYDROMORPHONE HYDROCHLORIDE 1 MG: 1 INJECTION, SOLUTION INTRAMUSCULAR; INTRAVENOUS; SUBCUTANEOUS at 22:40

## 2024-08-02 RX ADMIN — SODIUM CHLORIDE 125 ML/HR: 0.9 INJECTION, SOLUTION INTRAVENOUS at 23:42

## 2024-08-02 NOTE — DISCHARGE INSTRUCTIONS
This time of evaluation we did opt for you observation, it was recommended by the surgeon and myself but you did decline, we will attempt outpatient treatment but if anything worsens or returns please feel free to return to the ER for more evaluation and treatment.     avoid Celebrex or motrin or ibprofin  for the next 10 days

## 2024-08-02 NOTE — ED ATTENDING ATTESTATION
8/1/2024  ISergio DO, saw and evaluated the patient. I have discussed the patient with the resident/non-physician practitioner and agree with the resident's/non-physician practitioner's findings, Plan of Care, and MDM as documented in the resident's/non-physician practitioner's note, except where noted. All available labs and Radiology studies were reviewed.  I was present for key portions of any procedure(s) performed by the resident/non-physician practitioner and I was immediately available to provide assistance.    At this point I agree with the current assessment done in the Emergency Department.  I have conducted an independent evaluation of this patient a history and physical is as follows:    39 year old M. Presents with epigastric/upper abdominal pain.   Expresses bloating, decreased oral intake.  Pain is exacerbated with oral intake.  Of note, the patient was recently diagnosed with COVID-19 and prescribed a Medrol Dosepak.  On exam, the patient has tenderness along the epigastrium/right upper quadrant.  Laboratory workup significant for leukocytosis possibly secondary to recent steroid use, mildly elevated lipase.  Imaging without acute biliary, pancreatic findings. The case was discussed General Surgery.   Observation offered but the patient declined. See Ms. Paniagua's note for further details.    Const: Nontoxic appearing, elevated BMI  Eyes: No conjunctival injection, and symmetrical lids  HEENT: Atraumatic external nose and ears. Moist MM  Neck: Symmetric, trachea midline  CVS: +S1/S2, No murmurs or gallops. Peripheral pulses 2+ and equal in all extremities.  RESP: Unlabored respiratory effort. Clear to auscultation bilaterally   GI: Tenderness to palpation along the epigastrium, right upper quadrant.  Negative Hugo sign.  MSK: Normocephalic/Atraumatic, Extremities w/o deformity or ttp. No cyanosis or clubbing  Skin: Warm, Dry. No rashes or lesions.  Neuro: CNs II-XII grossly intact .  Sensation grossly intact.  Psych: Awake, Alert, & Oriented (AAO) x3 . Appropriate mood and affect             ED Course         Critical Care Time  Procedures

## 2024-08-03 PROBLEM — E66.01 MORBID OBESITY WITH BMI OF 60.0-69.9, ADULT (HCC): Status: ACTIVE | Noted: 2024-08-03

## 2024-08-03 PROBLEM — I10 ESSENTIAL HYPERTENSION: Status: ACTIVE | Noted: 2024-08-03

## 2024-08-03 PROBLEM — E78.5 HYPERLIPIDEMIA: Status: ACTIVE | Noted: 2024-08-03

## 2024-08-03 LAB
ALBUMIN SERPL BCG-MCNC: 3.8 G/DL (ref 3.5–5)
ALP SERPL-CCNC: 35 U/L (ref 34–104)
ALT SERPL W P-5'-P-CCNC: 38 U/L (ref 7–52)
ANION GAP SERPL CALCULATED.3IONS-SCNC: 3 MMOL/L (ref 4–13)
AST SERPL W P-5'-P-CCNC: 20 U/L (ref 13–39)
BILIRUB SERPL-MCNC: 0.88 MG/DL (ref 0.2–1)
BUN SERPL-MCNC: 17 MG/DL (ref 5–25)
CALCIUM SERPL-MCNC: 8.4 MG/DL (ref 8.4–10.2)
CHLORIDE SERPL-SCNC: 104 MMOL/L (ref 96–108)
CHOLEST SERPL-MCNC: 142 MG/DL
CO2 SERPL-SCNC: 30 MMOL/L (ref 21–32)
CREAT SERPL-MCNC: 0.88 MG/DL (ref 0.6–1.3)
ERYTHROCYTE [DISTWIDTH] IN BLOOD BY AUTOMATED COUNT: 15.8 % (ref 11.6–15.1)
GFR SERPL CREATININE-BSD FRML MDRD: 107 ML/MIN/1.73SQ M
GLUCOSE SERPL-MCNC: 89 MG/DL (ref 65–140)
HCT VFR BLD AUTO: 36.4 % (ref 36.5–49.3)
HDLC SERPL-MCNC: 23 MG/DL
HGB BLD-MCNC: 11.2 G/DL (ref 12–17)
LDLC SERPL CALC-MCNC: 65 MG/DL (ref 0–100)
MAGNESIUM SERPL-MCNC: 2.2 MG/DL (ref 1.9–2.7)
MCH RBC QN AUTO: 19.5 PG (ref 26.8–34.3)
MCHC RBC AUTO-ENTMCNC: 30.8 G/DL (ref 31.4–37.4)
MCV RBC AUTO: 64 FL (ref 82–98)
NONHDLC SERPL-MCNC: 119 MG/DL
PHOSPHATE SERPL-MCNC: 3.7 MG/DL (ref 2.7–4.5)
PLATELET # BLD AUTO: 311 THOUSANDS/UL (ref 149–390)
PMV BLD AUTO: 9.5 FL (ref 8.9–12.7)
POTASSIUM SERPL-SCNC: 3.7 MMOL/L (ref 3.5–5.3)
PROT SERPL-MCNC: 6.2 G/DL (ref 6.4–8.4)
RBC # BLD AUTO: 5.73 MILLION/UL (ref 3.88–5.62)
SODIUM SERPL-SCNC: 137 MMOL/L (ref 135–147)
TRIGL SERPL-MCNC: 268 MG/DL
WBC # BLD AUTO: 11.39 THOUSAND/UL (ref 4.31–10.16)

## 2024-08-03 PROCEDURE — 84100 ASSAY OF PHOSPHORUS: CPT | Performed by: NURSE PRACTITIONER

## 2024-08-03 PROCEDURE — 83735 ASSAY OF MAGNESIUM: CPT | Performed by: NURSE PRACTITIONER

## 2024-08-03 PROCEDURE — 99232 SBSQ HOSP IP/OBS MODERATE 35: CPT | Performed by: INTERNAL MEDICINE

## 2024-08-03 PROCEDURE — 80053 COMPREHEN METABOLIC PANEL: CPT | Performed by: NURSE PRACTITIONER

## 2024-08-03 PROCEDURE — 85027 COMPLETE CBC AUTOMATED: CPT | Performed by: NURSE PRACTITIONER

## 2024-08-03 PROCEDURE — 80061 LIPID PANEL: CPT | Performed by: NURSE PRACTITIONER

## 2024-08-03 RX ORDER — ACETAMINOPHEN 10 MG/ML
1000 INJECTION, SOLUTION INTRAVENOUS ONCE
Status: COMPLETED | OUTPATIENT
Start: 2024-08-03 | End: 2024-08-03

## 2024-08-03 RX ORDER — KETOROLAC TROMETHAMINE 30 MG/ML
15 INJECTION, SOLUTION INTRAMUSCULAR; INTRAVENOUS EVERY 6 HOURS PRN
Status: DISCONTINUED | OUTPATIENT
Start: 2024-08-03 | End: 2024-08-04 | Stop reason: HOSPADM

## 2024-08-03 RX ADMIN — PANTOPRAZOLE SODIUM 40 MG: 40 INJECTION, POWDER, LYOPHILIZED, FOR SOLUTION INTRAVENOUS at 07:52

## 2024-08-03 RX ADMIN — OXYCODONE HYDROCHLORIDE 5 MG: 5 TABLET ORAL at 18:28

## 2024-08-03 RX ADMIN — SODIUM CHLORIDE 125 ML/HR: 0.9 INJECTION, SOLUTION INTRAVENOUS at 07:30

## 2024-08-03 RX ADMIN — SODIUM CHLORIDE 75 ML/HR: 0.9 INJECTION, SOLUTION INTRAVENOUS at 20:03

## 2024-08-03 RX ADMIN — HYDROMORPHONE HYDROCHLORIDE 0.5 MG: 1 INJECTION, SOLUTION INTRAMUSCULAR; INTRAVENOUS; SUBCUTANEOUS at 07:50

## 2024-08-03 RX ADMIN — ENOXAPARIN SODIUM 40 MG: 40 INJECTION SUBCUTANEOUS at 08:11

## 2024-08-03 RX ADMIN — DULOXETINE HYDROCHLORIDE 60 MG: 60 CAPSULE, DELAYED RELEASE ORAL at 08:11

## 2024-08-03 RX ADMIN — HYDROMORPHONE HYDROCHLORIDE 0.5 MG: 1 INJECTION, SOLUTION INTRAMUSCULAR; INTRAVENOUS; SUBCUTANEOUS at 00:23

## 2024-08-03 RX ADMIN — OXYCODONE HYDROCHLORIDE 5 MG: 5 TABLET ORAL at 12:04

## 2024-08-03 RX ADMIN — TRAZODONE HYDROCHLORIDE 100 MG: 100 TABLET ORAL at 21:12

## 2024-08-03 RX ADMIN — LOSARTAN POTASSIUM 100 MG: 50 TABLET, FILM COATED ORAL at 08:11

## 2024-08-03 RX ADMIN — ACETAMINOPHEN 1000 MG: 1000 INJECTION, SOLUTION INTRAVENOUS at 00:29

## 2024-08-03 RX ADMIN — TRAZODONE HYDROCHLORIDE 100 MG: 100 TABLET ORAL at 00:07

## 2024-08-03 RX ADMIN — FENOFIBRATE 145 MG: 145 TABLET, FILM COATED ORAL at 08:11

## 2024-08-03 RX ADMIN — KETOROLAC TROMETHAMINE 15 MG: 30 INJECTION, SOLUTION INTRAMUSCULAR at 19:56

## 2024-08-03 NOTE — ASSESSMENT & PLAN NOTE
Presents with severe epigastric abdominal pain, nausea and vomiting beginning on Tuesday  Lipase elevated 373  Recently treated with Paxlovid for COVID and taking semaglutide for weight loss  Admit for observation  Pain control  Clear liquids  IV hydration

## 2024-08-03 NOTE — H&P
[facility]  H&P  Name: Yusef Mendieta 39 y.o. male I MRN: [id number]  Unit/Bed#: -01 I Date of Admission: 8/2/2024   Date of Service: 8/3/2024 I Hospital Day: 0      Assessment & Plan   * Acute pancreatitis  Assessment & Plan  Presents with severe epigastric abdominal pain, nausea and vomiting beginning on Tuesday  Lipase elevated 373  Recently treated with Paxlovid for COVID and taking semaglutide for weight loss  Admit for observation  Pain control  Clear liquids  IV hydration    Essential hypertension  Assessment & Plan  PTA Diovan  Trend blood pressures    Hyperlipidemia  Assessment & Plan  Continue PTA Tricor  Update lipid panel    Morbid obesity with BMI of 60.0-69.9, adult (HCC)  Assessment & Plan  Currently following with weight management physician  Taking semaglutide and metformin in the outpatient setting  Currently on hold due to pancreatitis  Outpatient follow-up         VTE Pharmacologic Prophylaxis:   High Risk (Score >/= 5) - Pharmacological DVT Prophylaxis Ordered: enoxaparin (Lovenox). Sequential Compression Devices Ordered.  Code Status: Level 1 - Full Code   Discussion with family: Patient declined call to .     Anticipated Length of Stay: Patient will be admitted on an observation basis with an anticipated length of stay of less than 2 midnights secondary to pancreatitis.    Total Time Spent on Date of Encounter in care of patient: 45 mins. This time was spent on one or more of the following: performing physical exam; counseling and coordination of care; obtaining or reviewing history; documenting in the medical record; reviewing/ordering tests, medications or procedures; communicating with other healthcare professionals and discussing with patient's family/caregivers.    Chief Complaint: abd pain     History of Present Illness:  Yusef Mendieta is a 39 y.o. male with a PMH of obesity, hypertension, anxiety, thalassemia, recurrent COVID who was  recently treated with Paxlovid.  Currently following with weight loss provider prescribed semaglutide and metformin.  Developed severe epigastric abdominal pain with nausea and vomiting on Tuesday, seen in the ED on 8/1 with mild pancreatitis discharged home return on 8/2 with worsening symptoms presented to the medical service for further evaluation and treatment.  Denies tobacco abuse, denies alcohol use.    Review of Systems:  Review of Systems   Constitutional:  Negative for chills and fever.   HENT:  Negative for ear pain and sore throat.    Eyes:  Negative for pain and visual disturbance.   Respiratory:  Negative for cough and shortness of breath.    Cardiovascular:  Negative for chest pain and palpitations.   Gastrointestinal:  Positive for abdominal pain, nausea and vomiting.   Genitourinary:  Negative for dysuria and hematuria.   Musculoskeletal:  Negative for arthralgias and back pain.   Skin:  Negative for color change and rash.   Neurological:  Negative for seizures and syncope.   All other systems reviewed and are negative.      Past Medical and Surgical History:   Past Medical History:   Diagnosis Date    Anxiety     Hypertension     Thalassemia        Past Surgical History:   Procedure Laterality Date    HERNIA REPAIR         Meds/Allergies:  Prior to Admission medications    Medication Sig Start Date End Date Taking? Authorizing Provider   celecoxib (CeleBREX) 50 MG capsule Take 50 mg by mouth daily    Historical Provider, MD   cyclobenzaprine (FLEXERIL) 10 mg tablet Take 1 tablet (10 mg total) by mouth 3 (three) times a day as needed for muscle spasms 7/22/24   Jarod Spear PA-C   diclofenac (VOLTAREN) 75 mg EC tablet Take 75 mg by mouth every 12 (twelve) hours as needed 6/5/23   Historical Provider, MD   dicyclomine (BENTYL) 20 mg tablet Take 1 tablet (20 mg total) by mouth 2 (two) times a day for 14 days 8/1/24 8/15/24  Onesimo Paniagua PA-C   DULoxetine (CYMBALTA) 60 mg delayed release  capsule Take 60 mg by mouth daily 8/29/23   Historical Provider, MD   famotidine (PEPCID) 20 mg tablet Take 1 tablet (20 mg total) by mouth 2 (two) times a day for 14 days 8/1/24 8/15/24  Onesimo Paniagua PA-C   fenofibrate (TRICOR) 145 mg tablet Take 145 mg by mouth daily 6/26/23 6/25/24  Historical Provider, MD   hydrochlorothiazide (HYDRODIURIL) 25 mg tablet Take 25 mg by mouth daily 8/23/23 8/22/24  Historical Provider, MD   lidocaine (Lidoderm) 5 % Apply 1 patch topically over 12 hours daily for 7 days Apply to low back region of pain.  Remove & Discard patch within 12 hours or as directed by MD 12/22/23 12/29/23  Nuno Glez MD   methocarbamol (ROBAXIN) 500 mg tablet Take 1 tablet (500 mg total) by mouth 2 (two) times a day  Patient not taking: Reported on 7/22/2024 12/22/23   Nuno Glez MD   methylPREDNISolone 4 MG tablet therapy pack Use as directed on package 7/22/24   Jarod Spear PA-C   naproxen (Naprosyn) 500 mg tablet Take 1 tablet (500 mg total) by mouth 2 (two) times a day with meals 12/22/23   Nuno Glez MD   ondansetron (ZOFRAN) 4 mg tablet Take 1 tablet (4 mg total) by mouth every 6 (six) hours 8/1/24   Onesimo Paniagua PA-C   pantoprazole (PROTONIX) 40 mg tablet Take 40 mg by mouth daily 8/23/23 8/22/24  Historical Provider, MD   sucralfate (CARAFATE) 1 g tablet Take 1 tablet (1 g total) by mouth 4 (four) times a day for 14 days 8/1/24 8/15/24  Onesimo Paniagua PA-C   traZODone (DESYREL) 100 mg tablet Take 100 mg by mouth 8/29/23   Historical Provider, MD   valsartan (DIOVAN) 320 MG tablet Take 320 mg by mouth daily 8/23/23 8/22/24  Historical Provider, MD PICKERING have reviewed home medications with patient personally.    Allergies: No Known Allergies    Social History:  Marital Status:    Occupation:   Patient Pre-hospital Living Situation: Home  Patient Pre-hospital Level of Mobility: walks  Patient Pre-hospital Diet Restrictions: None  Substance  "Use History:   Social History     Substance and Sexual Activity   Alcohol Use Yes    Comment: occasionally     Social History     Tobacco Use   Smoking Status Never   Smokeless Tobacco Never     Social History     Substance and Sexual Activity   Drug Use Never       Family History:  Family History   Problem Relation Age of Onset    Hypertension Father        Physical Exam:     Vitals:   Blood Pressure: 148/90 (08/02/24 2343)  Pulse: 76 (08/02/24 2343)  Temperature: 97.9 °F (36.6 °C) (08/03/24 0023)  Temp Source: Temporal (08/03/24 0023)  Respirations: 18 (08/02/24 2343)  Height: 5' 10\" (177.8 cm) (08/02/24 2110)  Weight - Scale: (!) 195 kg (429 lb 14.4 oz) (08/02/24 2110)  SpO2: 93 % (08/02/24 2343)    Physical Exam  Vitals and nursing note reviewed.   Constitutional:       General: He is not in acute distress.     Appearance: He is well-developed. He is obese.   HENT:      Head: Normocephalic and atraumatic.      Mouth/Throat:      Mouth: Mucous membranes are dry.   Eyes:      Conjunctiva/sclera: Conjunctivae normal.   Cardiovascular:      Rate and Rhythm: Normal rate and regular rhythm.      Heart sounds: No murmur heard.  Pulmonary:      Effort: Pulmonary effort is normal. No respiratory distress.      Breath sounds: Normal breath sounds.   Abdominal:      Palpations: Abdomen is soft.      Tenderness: There is abdominal tenderness.   Musculoskeletal:         General: No swelling.      Cervical back: Neck supple.   Skin:     General: Skin is warm and dry.      Capillary Refill: Capillary refill takes less than 2 seconds.   Neurological:      General: No focal deficit present.      Mental Status: He is alert and oriented to person, place, and time.   Psychiatric:         Mood and Affect: Mood normal.         Behavior: Behavior normal.        Additional Data:     Lab Results:  Results from last 7 days   Lab Units 08/02/24  2124   WBC Thousand/uL 13.95*   HEMOGLOBIN g/dL 12.8   HEMATOCRIT % 42.0   PLATELETS " Thousands/uL 379   SEGS PCT % 66   LYMPHO PCT % 25   MONO PCT % 5   EOS PCT % 3     Results from last 7 days   Lab Units 08/02/24 2124   SODIUM mmol/L 137   POTASSIUM mmol/L 3.8   CHLORIDE mmol/L 100   CO2 mmol/L 29   BUN mg/dL 18   CREATININE mg/dL 0.98   ANION GAP mmol/L 8   CALCIUM mg/dL 9.2   ALBUMIN g/dL 4.4   TOTAL BILIRUBIN mg/dL 0.79   ALK PHOS U/L 42   ALT U/L 47   AST U/L 27   GLUCOSE RANDOM mg/dL 88             Lab Results   Component Value Date    HGBA1C 6.2 (H) 05/29/2024    HGBA1C 6.2 (H) 03/01/2024    HGBA1C 6.2 (H) 04/26/2019     Results from last 7 days   Lab Units 08/02/24 2124 08/01/24  1604   LACTIC ACID mmol/L 0.9 1.0       Lines/Drains:  Invasive Devices       Peripheral Intravenous Line  Duration             Peripheral IV 08/02/24 Left Antecubital <1 day                        Imaging: Reviewed radiology reports from this admission including: abdominal/pelvic CT  No orders to display   CT abdomen pelvis from 8/1    Study Result    Narrative & Impression   CT ABDOMEN AND PELVIS WITH IV CONTRAST     INDICATION: upper abdominal pain radiating to the back.     COMPARISON: CT abdomen and pelvis 5/7/2024.     TECHNIQUE: CT examination of the abdomen and pelvis was performed. Multiplanar 2D reformatted images were created from the source data.     This examination, like all CT scans performed in the [facility], was performed utilizing techniques to minimize radiation dose exposure, including the use of iterative reconstruction and automated exposure control. Radiation dose length   product (DLP) for this visit: 2423 mGy-cm     IV Contrast: 100 mL of iohexol (OMNIPAQUE)  Enteric Contrast: Not administered.     FINDINGS:     ABDOMEN     LOWER CHEST: No clinically significant abnormality in the visualized lower chest.     LIVER/BILIARY TREE: Hepatic steatosis.     GALLBLADDER: No calcified gallstones. No pericholecystic inflammatory change.     SPLEEN: Splenule also noted.      PANCREAS: Unremarkable.     ADRENAL GLANDS: Unremarkable.     KIDNEYS/URETERS: Unremarkable. No hydronephrosis.     STOMACH AND BOWEL: Colonic diverticulosis without evidence of diverticulitis.     APPENDIX: No findings to suggest appendicitis.     ABDOMINOPELVIC CAVITY: No pneumoperitoneum. No lymphadenopathy.     VESSELS: Unremarkable for patient's age.     PELVIS     REPRODUCTIVE ORGANS: Unremarkable for patient's age.     URINARY BLADDER: Unremarkable.     ABDOMINAL WALL/INGUINAL REGIONS: Redemonstrated peripherally calcified focus of fat deep to the umbilicus, statistically reflective of fat necrosis.     BONES: No acute fracture.     IMPRESSION:     No acute findings in the abdomen and pelvis.             ** Please Note: This note has been constructed using a voice recognition system. **

## 2024-08-03 NOTE — ASSESSMENT & PLAN NOTE
Currently following with weight management physician  Taking semaglutide and metformin in the outpatient setting  Currently on hold due to pancreatitis  Outpatient follow-up

## 2024-08-03 NOTE — ED PROVIDER NOTES
History  Chief Complaint   Patient presents with    Abdominal Pain     Was seen here yesterday, lipase levels were elevated.      Patient is a 39-year-old male presenting to the emergency department complaining of epigastric abdominal pain and nausea, symptoms started Tuesday and have been constant since, he was seen in the ED yesterday and noted to have a elevated lipase, admission was recommended but he declined, states his symptoms have not improved at all throughout the day today prompting him to come back to the emergency department for reevaluation, he denies any fevers, no vomiting, no diarrhea, he was recently diagnosed with COVID-19, he finished Paxlovid Tuesday morning        Prior to Admission Medications   Prescriptions Last Dose Informant Patient Reported? Taking?   DULoxetine (CYMBALTA) 60 mg delayed release capsule 8/2/2024  Yes Yes   Sig: Take 60 mg by mouth daily   celecoxib (CeleBREX) 50 MG capsule Past Week  Yes Yes   Sig: Take 50 mg by mouth daily   cyclobenzaprine (FLEXERIL) 10 mg tablet Not Taking  No No   Sig: Take 1 tablet (10 mg total) by mouth 3 (three) times a day as needed for muscle spasms   Patient not taking: Reported on 8/3/2024   diclofenac (VOLTAREN) 75 mg EC tablet Not Taking  Yes No   Sig: Take 75 mg by mouth every 12 (twelve) hours as needed   Patient not taking: Reported on 8/3/2024   dicyclomine (BENTYL) 20 mg tablet 8/2/2024  No Yes   Sig: Take 1 tablet (20 mg total) by mouth 2 (two) times a day for 14 days   famotidine (PEPCID) 20 mg tablet 8/2/2024  No Yes   Sig: Take 1 tablet (20 mg total) by mouth 2 (two) times a day for 14 days   fenofibrate (TRICOR) 145 mg tablet   Yes No   Sig: Take 145 mg by mouth daily   hydrochlorothiazide (HYDRODIURIL) 25 mg tablet 8/2/2024  Yes Yes   Sig: Take 25 mg by mouth daily   lidocaine (Lidoderm) 5 %   No No   Sig: Apply 1 patch topically over 12 hours daily for 7 days Apply to low back region of pain.  Remove & Discard patch within 12 hours  or as directed by MD   methocarbamol (ROBAXIN) 500 mg tablet Not Taking  No No   Sig: Take 1 tablet (500 mg total) by mouth 2 (two) times a day   Patient not taking: Reported on 7/22/2024   methylPREDNISolone 4 MG tablet therapy pack Not Taking  No No   Sig: Use as directed on package   Patient not taking: Reported on 8/3/2024   naproxen (Naprosyn) 500 mg tablet Not Taking  No No   Sig: Take 1 tablet (500 mg total) by mouth 2 (two) times a day with meals   Patient not taking: Reported on 8/3/2024   ondansetron (ZOFRAN) 4 mg tablet Unknown  No No   Sig: Take 1 tablet (4 mg total) by mouth every 6 (six) hours   pantoprazole (PROTONIX) 40 mg tablet 8/2/2024  Yes Yes   Sig: Take 40 mg by mouth daily   sucralfate (CARAFATE) 1 g tablet 8/2/2024  No Yes   Sig: Take 1 tablet (1 g total) by mouth 4 (four) times a day for 14 days   traZODone (DESYREL) 100 mg tablet 8/2/2024  Yes Yes   Sig: Take 100 mg by mouth   valsartan (DIOVAN) 320 MG tablet 8/2/2024  Yes Yes   Sig: Take 320 mg by mouth daily      Facility-Administered Medications: None       Past Medical History:   Diagnosis Date    Anxiety     Hypertension     Thalassemia        Past Surgical History:   Procedure Laterality Date    HERNIA REPAIR         Family History   Problem Relation Age of Onset    Hypertension Father      I have reviewed and agree with the history as documented.    E-Cigarette/Vaping    E-Cigarette Use Never User      E-Cigarette/Vaping Substances    Nicotine No     THC No     CBD No     Flavoring No     Other No     Unknown No      Social History     Tobacco Use    Smoking status: Never    Smokeless tobacco: Never   Vaping Use    Vaping status: Never Used   Substance Use Topics    Alcohol use: Yes     Comment: occasionally    Drug use: Never       Review of Systems   Constitutional: Negative.    HENT: Negative.     Eyes: Negative.    Respiratory: Negative.     Cardiovascular: Negative.    Gastrointestinal:  Positive for abdominal pain and nausea.    Endocrine: Negative.    Genitourinary: Negative.    Musculoskeletal: Negative.    Skin: Negative.    Allergic/Immunologic: Negative.    Neurological: Negative.    Hematological: Negative.    Psychiatric/Behavioral: Negative.         Physical Exam  Physical Exam  Constitutional:       Appearance: He is well-developed. He is obese.   HENT:      Head: Normocephalic and atraumatic.   Eyes:      Pupils: Pupils are equal, round, and reactive to light.   Cardiovascular:      Rate and Rhythm: Normal rate and regular rhythm.   Pulmonary:      Breath sounds: Normal breath sounds.   Abdominal:      Palpations: Abdomen is soft.      Tenderness: There is abdominal tenderness in the epigastric area.   Musculoskeletal:         General: Normal range of motion.      Cervical back: Normal range of motion and neck supple.   Skin:     General: Skin is warm and dry.   Neurological:      Mental Status: He is alert.   Psychiatric:         Behavior: Behavior normal.         Vital Signs  ED Triage Vitals   Temperature Pulse Respirations Blood Pressure SpO2   08/02/24 2116 08/02/24 2110 08/02/24 2110 08/02/24 2110 08/02/24 2110   97.7 °F (36.5 °C) 78 20 (!) 187/94 96 %      Temp Source Heart Rate Source Patient Position - Orthostatic VS BP Location FiO2 (%)   08/02/24 2116 08/02/24 2115 08/02/24 2115 08/02/24 2115 --   Oral Monitor Sitting Right arm       Pain Score       08/02/24 2139       10 - Worst Possible Pain           Vitals:    08/02/24 2230 08/02/24 2245 08/02/24 2300 08/02/24 2343   BP: 129/63 125/56 114/53 148/90   Pulse: 74 74 73 76   Patient Position - Orthostatic VS: Lying Lying           Visual Acuity      ED Medications  Medications   sodium chloride 0.9 % infusion (125 mL/hr Intravenous New Bag 8/2/24 2342)   enoxaparin (LOVENOX) subcutaneous injection 40 mg (has no administration in time range)   pantoprazole (PROTONIX) injection 40 mg (has no administration in time range)   acetaminophen (TYLENOL) tablet 975 mg (has no  administration in time range)   oxyCODONE (ROXICODONE) IR tablet 5 mg (has no administration in time range)   losartan (COZAAR) tablet 100 mg (has no administration in time range)   traZODone (DESYREL) tablet 100 mg (100 mg Oral Given 8/3/24 0007)   fenofibrate (TRICOR) tablet 145 mg (has no administration in time range)   DULoxetine (CYMBALTA) delayed release capsule 60 mg (has no administration in time range)   HYDROmorphone (DILAUDID) injection 0.5 mg (has no administration in time range)   ketorolac (TORADOL) injection 15 mg (has no administration in time range)   sodium chloride 0.9 % bolus 1,000 mL (0 mL Intravenous Stopped 8/2/24 2230)   HYDROmorphone (DILAUDID) injection 0.5 mg (0.5 mg Intravenous Given 8/2/24 2139)   HYDROmorphone (DILAUDID) injection 1 mg (1 mg Intravenous Given 8/2/24 2240)   acetaminophen (Ofirmev) injection 1,000 mg (1,000 mg Intravenous New Bag 8/3/24 0029)       Diagnostic Studies  Results Reviewed       Procedure Component Value Units Date/Time    Lactic acid, plasma (w/reflex if result > 2.0) [[id number]]  (Normal) Collected: 08/02/24 2124    Lab Status: Final result Specimen: Blood from Arm, Left Updated: 08/02/24 2148     LACTIC ACID 0.9 mmol/L     Narrative:      Result may be elevated if tourniquet was used during collection.    Comprehensive metabolic panel [[id number]] Collected: 08/02/24 2124    Lab Status: Final result Specimen: Blood from Arm, Left Updated: 08/02/24 2148     Sodium 137 mmol/L      Potassium 3.8 mmol/L      Chloride 100 mmol/L      CO2 29 mmol/L      ANION GAP 8 mmol/L      BUN 18 mg/dL      Creatinine 0.98 mg/dL      Glucose 88 mg/dL      Calcium 9.2 mg/dL      AST 27 U/L      ALT 47 U/L      Alkaline Phosphatase 42 U/L      Total Protein 7.4 g/dL      Albumin 4.4 g/dL      Total Bilirubin 0.79 mg/dL      eGFR 96 ml/min/1.73sq m     Narrative:      [employer] guidelines for Chronic Kidney Disease (CKD):     Stage 1 with normal or high  GFR (GFR > 90 mL/min/1.73 square meters)    Stage 2 Mild CKD (GFR = 60-89 mL/min/1.73 square meters)    Stage 3A Moderate CKD (GFR = 45-59 mL/min/1.73 square meters)    Stage 3B Moderate CKD (GFR = 30-44 mL/min/1.73 square meters)    Stage 4 Severe CKD (GFR = 15-29 mL/min/1.73 square meters)    Stage 5 End Stage CKD (GFR <15 mL/min/1.73 square meters)  Note: GFR calculation is accurate only with a steady state creatinine    Lipase [[id number]]  (Abnormal) Collected: 08/02/24 2124    Lab Status: Final result Specimen: Blood from Arm, Left Updated: 08/02/24 2148     Lipase 373 u/L     UA w Reflex to Microscopic w Reflex to Culture [[id number]] Collected: 08/02/24 2129    Lab Status: Final result Specimen: Urine, Clean Catch Updated: 08/02/24 2136     Color, UA Yellow     Clarity, UA Clear     Specific Gravity, UA 1.020     pH, UA 6.0     Leukocytes, UA Negative     Nitrite, UA Negative     Protein, UA Negative mg/dl      Glucose, UA Negative mg/dl      Ketones, UA Negative mg/dl      Urobilinogen, UA 1.0 E.U./dl      Bilirubin, UA Negative     Occult Blood, UA Negative    CBC and differential [[id number]]  (Abnormal) Collected: 08/02/24 2124    Lab Status: Final result Specimen: Blood from Arm, Left Updated: 08/02/24 2129     WBC 13.95 Thousand/uL      RBC 6.59 Million/uL      Hemoglobin 12.8 g/dL      Hematocrit 42.0 %      MCV 64 fL      MCH 19.4 pg      MCHC 30.5 g/dL      RDW 17.5 %      MPV 9.3 fL      Platelets 379 Thousands/uL      nRBC 0 /100 WBCs      Segmented % 66 %      Immature Grans % 1 %      Lymphocytes % 25 %      Monocytes % 5 %      Eosinophils Relative 3 %      Basophils Relative 0 %      Absolute Neutrophils 9.19 Thousands/µL      Absolute Immature Grans 0.20 Thousand/uL      Absolute Lymphocytes 3.46 Thousands/µL      Absolute Monocytes 0.69 Thousand/µL      Eosinophils Absolute 0.35 Thousand/µL      Basophils Absolute 0.06 Thousands/µL                    No orders to display               Procedures  Procedures         ED Course  ED Course as of 08/03/24 0136   Sat Aug 03, 2024   0135 UA w Reflex to Microscopic w Reflex to Culture   0135 Lactic acid, plasma (w/reflex if result > 2.0)   0135 Lipase(!)   0135 Comprehensive metabolic panel   0135 CBC and differential(!)                                 SBIRT 20yo+      Flowsheet Row Most Recent Value   Initial Alcohol Screen: US AUDIT-C     1. How often do you have a drink containing alcohol? 0 Filed at: 08/02/2024 2115   2. How many drinks containing alcohol do you have on a typical day you are drinking?  0 Filed at: 08/02/2024 2115   3a. Male UNDER 65: How often do you have five or more drinks on one occasion? 0 Filed at: 08/02/2024 2115   3b. FEMALE Any Age, or MALE 65+: How often do you have 4 or more drinks on one occassion? 0 Filed at: 08/02/2024 2115   Audit-C Score 0 Filed at: 08/02/2024 2115   ALEXANDER: How many times in the past year have you...    Used an illegal drug or used a prescription medication for non-medical reasons? Never Filed at: 08/02/2024 2115                      Medical Decision Making  Abdominal exam without peritoneal signs.  No evidence of acute abdomen at this time, mild epigastric tenderness on exam.  Well-appearing.  Given work-up, low suspicion for acute hepatobiliary disease (including acute cholecystitis or cholangitis),  PUD (including gastric perforation), acute infectious processes (pneumonia, hepatitis, pyelonephritis), acute appendicitis, vascular catastrophe, bowel obstruction, viscus perforation, ovarian/testicular torsion, or diverticulitis.  Patient's lipase elevated, from 329 yesterday to 373 today, recommended admission for further evaluation and treatment, hospitalist service accepts patient as observation at this time    Problems Addressed:  Pancreatitis: acute illness or injury    Amount and/or Complexity of Data Reviewed  External Data Reviewed: labs, radiology and notes.  Labs: ordered.  Decision-making details documented in ED Course.    Risk  Prescription drug management.  Decision regarding hospitalization.                 Disposition  Final diagnoses:   Pancreatitis     Time reflects when diagnosis was documented in both MDM as applicable and the Disposition within this note       Time User Action Codes Description Comment    8/2/2024 10:35 PM Awa Montez [K85.90] Pancreatitis           ED Disposition       ED Disposition   Admit    Condition   Stable    Date/Time   Fri Aug 2, 2024 3761    Comment   Case was discussed with Latonya Rodriguez and the patient's admission status was agreed to be Admission Status: observation status to the service of Dr. Thomson .               Follow-up Information    None         Current Discharge Medication List        CONTINUE these medications which have NOT CHANGED    Details   celecoxib (CeleBREX) 50 MG capsule Take 50 mg by mouth daily      dicyclomine (BENTYL) 20 mg tablet Take 1 tablet (20 mg total) by mouth 2 (two) times a day for 14 days  Qty: 28 tablet, Refills: 0    Associated Diagnoses: Pain of upper abdomen      DULoxetine (CYMBALTA) 60 mg delayed release capsule Take 60 mg by mouth daily      famotidine (PEPCID) 20 mg tablet Take 1 tablet (20 mg total) by mouth 2 (two) times a day for 14 days  Qty: 28 tablet, Refills: 0    Associated Diagnoses: Pain of upper abdomen      hydrochlorothiazide (HYDRODIURIL) 25 mg tablet Take 25 mg by mouth daily      pantoprazole (PROTONIX) 40 mg tablet Take 40 mg by mouth daily      sucralfate (CARAFATE) 1 g tablet Take 1 tablet (1 g total) by mouth 4 (four) times a day for 14 days  Qty: 56 tablet, Refills: 0    Associated Diagnoses: Pain of upper abdomen      traZODone (DESYREL) 100 mg tablet Take 100 mg by mouth      valsartan (DIOVAN) 320 MG tablet Take 320 mg by mouth daily      cyclobenzaprine (FLEXERIL) 10 mg tablet Take 1 tablet (10 mg total) by mouth 3 (three) times a day as needed for muscle spasms  Qty: 10  tablet, Refills: 0    Associated Diagnoses: Acute bilateral low back pain with left-sided sciatica      diclofenac (VOLTAREN) 75 mg EC tablet Take 75 mg by mouth every 12 (twelve) hours as needed      fenofibrate (TRICOR) 145 mg tablet Take 145 mg by mouth daily      lidocaine (Lidoderm) 5 % Apply 1 patch topically over 12 hours daily for 7 days Apply to low back region of pain.  Remove & Discard patch within 12 hours or as directed by MD  Qty: 7 patch, Refills: 0    Associated Diagnoses: Low back pain      methocarbamol (ROBAXIN) 500 mg tablet Take 1 tablet (500 mg total) by mouth 2 (two) times a day  Qty: 20 tablet, Refills: 0    Associated Diagnoses: Low back pain      methylPREDNISolone 4 MG tablet therapy pack Use as directed on package  Qty: 21 each, Refills: 0    Associated Diagnoses: Acute bilateral low back pain with left-sided sciatica      naproxen (Naprosyn) 500 mg tablet Take 1 tablet (500 mg total) by mouth 2 (two) times a day with meals  Qty: 30 tablet, Refills: 0    Associated Diagnoses: Low back pain      ondansetron (ZOFRAN) 4 mg tablet Take 1 tablet (4 mg total) by mouth every 6 (six) hours  Qty: 12 tablet, Refills: 0    Associated Diagnoses: Pain of upper abdomen             No discharge procedures on file.    PDMP Review       None            ED Provider  Electronically Signed by             Awa Montez DO  08/03/24 0136

## 2024-08-03 NOTE — UTILIZATION REVIEW
Initial Clinical Review    Admission: Date/Time/Statement:   Admission Orders (From admission, onward)       Ordered        08/02/24 2235  Place in Observation  Once                          Orders Placed This Encounter   Procedures    Place in Observation     Standing Status:   Standing     Number of Occurrences:   1     Order Specific Question:   Level of Care     Answer:   Med Surg [16]     ED Arrival Information       Expected   -    Arrival   8/2/2024 21:08    Acuity   Urgent              Means of arrival   Walk-In    Escorted by   Self    Service   Hospitalist    Admission type   Emergency              Arrival complaint   Abdominal pain             Chief Complaint   Patient presents with    Abdominal Pain     Was seen here yesterday, lipase levels were elevated.        Initial Presentation: 39 y.o. male with a PMH of obesity, hypertension, anxiety, thalassemia, and recurrent COVID, recently treated with Paxlovid, and currently following with weight loss provider prescribed semaglutide and metformin, presents to the ED from home with complaint of severe epigastric abdominal pain with nausea and vomiting that started on Tuesday. He was seen in the ED on 8/1 for mild pancreatitis and discharged home. He returns today with  worsening symptoms.  Denies tobacco abuse, denies alcohol use. ED workup revealed lipase of 373.  Received IV fluid and two doses of IV Dilaudid in the ED.  Exam:  AOx3. Abdominal tenderness.     8/2  Plan: Admit to Observation for evaluation of  acute pancreatitis:  Clear liquids, IVF, pain control, update lipid panel, semaglutide and metformin currently on hold.         ED Triage Vitals   Temperature Pulse Respirations Blood Pressure SpO2 Pain Score   08/02/24 2116 08/02/24 2110 08/02/24 2110 08/02/24 2110 08/02/24 2110 08/02/24 2139   97.7 °F (36.5 °C) 78 20 (!) 187/94 96 % 10 - Worst Possible Pain     Weight (last 2 days)       Date/Time Weight    08/02/24 2110 195 (429.9)            Vital  Signs (last 3 days)      Vital Signs (last 3 days) before discharge       Date/Time Temp Pulse Resp BP MAP (mmHg) SpO2 O2 Device Michael Coma Scale Score Pain    08/03/24 0934 -- -- -- -- -- 96 % None (Room air) 15 --    08/03/24 0750 -- -- -- -- -- -- -- -- 8    08/03/24 07:04:12 97.2 °F (36.2 °C) 56 18 134/85 101 97 % -- -- --    08/03/24 0023 97.9 °F (36.6 °C) -- -- -- -- -- None (Room air) 15 7    08/02/24 23:43:54 -- 76 18 148/90 109 93 % -- -- --    08/02/24 2300 -- 73 -- 114/53 77 89 % -- -- --    08/02/24 2245 -- 74 16 125/56 82 94 % None (Room air) -- --    08/02/24 2240 -- -- -- -- -- -- -- -- 8 08/02/24 2230 -- 74 18 129/63 90 97 % None (Room air) -- --    08/02/24 2215 -- 77 16 133/62 89 95 % None (Room air) -- --    08/02/24 2200 -- 76 18 134/59 85 95 % None (Room air) -- --    08/02/24 2145 -- 71 20 126/59 85 96 % None (Room air) -- --    08/02/24 2144 -- -- -- -- -- -- -- 15 --    08/02/24 2139 -- -- -- -- -- -- -- -- 10 - Worst Possible Pain    08/02/24 2130 -- 85 18 151/75 105 96 % None (Room air) -- --    08/02/24 2116 97.7 °F (36.5 °C) -- -- -- -- -- -- -- --    08/02/24 2115 -- 78 18 192/95 132 96 % None (Room air) -- --    08/02/24 2110 -- 78 20 187/94 -- 96 % None (Room air) -- --                  Pertinent Labs/Diagnostic Test Results:           Results from last 7 days   Lab Units 08/03/24 0449 08/02/24 2124   WBC Thousand/uL 11.39* 13.95*   HEMOGLOBIN g/dL 11.2* 12.8   HEMATOCRIT % 36.4* 42.0   PLATELETS Thousands/uL 311 379   TOTAL NEUT ABS Thousands/µL  --  9.19*         Results from last 7 days   Lab Units 08/03/24 0449 08/02/24 2124   SODIUM mmol/L 137 137   POTASSIUM mmol/L 3.7 3.8   CHLORIDE mmol/L 104 100   CO2 mmol/L 30 29   ANION GAP mmol/L 3* 8   BUN mg/dL 17 18   CREATININE mg/dL 0.88 0.98   EGFR ml/min/1.73sq m 107 96   CALCIUM mg/dL 8.4 9.2   MAGNESIUM mg/dL 2.2  --    PHOSPHORUS mg/dL 3.7  --      Results from last 7 days   Lab Units 08/03/24 0449 08/02/24 2124   AST  U/L 20 27   ALT U/L 38 47   ALK PHOS U/L 35 42   TOTAL PROTEIN g/dL 6.2* 7.4   ALBUMIN g/dL 3.8 4.4   TOTAL BILIRUBIN mg/dL 0.88 0.79         Results from last 7 days   Lab Units 08/03/24  0449 08/02/24 2124   GLUCOSE RANDOM mg/dL 89 88               Results from last 7 days   Lab Units 08/02/24 2124   LACTIC ACID mmol/L 0.9               Results from last 7 days   Lab Units 08/02/24 2124 08/01/24  1604   LIPASE u/L 373* 329*                 Results from last 7 days   Lab Units 08/02/24 2129   CLARITY UA  Clear   COLOR UA  Yellow   SPEC GRAV UA  1.020   PH UA  6.0   GLUCOSE UA mg/dl Negative   KETONES UA mg/dl Negative   BLOOD UA  Negative   PROTEIN UA mg/dl Negative   NITRITE UA  Negative   BILIRUBIN UA  Negative   UROBILINOGEN UA E.U./dl 1.0   LEUKOCYTES UA  Negative               ED Treatment-Medication Administration from 08/02/2024 2106 to 08/02/2024 2338         Date/Time Order Dose Route Action     08/02/2024 2129 sodium chloride 0.9 % bolus 1,000 mL 1,000 mL Intravenous New Bag     08/02/2024 2139 HYDROmorphone (DILAUDID) injection 0.5 mg 0.5 mg Intravenous Given     08/02/2024 2240 HYDROmorphone (DILAUDID) injection 1 mg 1 mg Intravenous Given            Past Medical History:   Diagnosis Date    Anxiety     Hypertension     Thalassemia          Admitting Diagnosis: Pancreatitis [K85.90]  Abdominal pain [R10.9]  Age/Sex: 39 y.o. male      Admission Orders: Clear liquid diet, SCD.    Scheduled Medications:    DULoxetine, 60 mg, Oral, Daily  enoxaparin, 40 mg, Subcutaneous, Daily  fenofibrate, 145 mg, Oral, Daily  losartan, 100 mg, Oral, Daily  pantoprazole, 40 mg, Intravenous, Q24H CHATA  traZODone, 100 mg, Oral, HS    acetaminophen (Ofirmev) injection 1,000 mg  Dose: 1,000 mg  Freq: Once Route: IV  Last Dose: 1,000 mg (08/03/24 0029)  Start: 08/03/24 0030 End: 08/03/24 0044      Continuous IV Infusions:    sodium chloride, 125 mL/hr, Intravenous, Continuous      PRN Meds:  acetaminophen, 975 mg, Oral, Q8H  PRN  HYDROmorphone, 0.5 mg, Intravenous, Q3H PRN x 2 doses 8/3 @ 0023, 0980  ketorolac, 15 mg, Intravenous, Q6H PRN  oxyCODONE, 5 mg, Oral, Q6H PRN      None    Network Utilization Review Department  ATTENTION: Please call with any questions or concerns to [phone] and carefully listen to the prompts so that you are directed to the right person. All voicemails are confidential.   For Discharge needs, contact Care Management DC Support Team at [phone] opt. 2  Send all requests for admission clinical reviews, approved or denied determinations and any other requests to dedicated fax number below belonging to the campus where the patient is receiving treatment. List of dedicated fax numbers for the Facilities:  FACILITY NAME UR FAX NUMBER   ADMISSION DENIALS (Administrative/Medical Necessity) [phone]   DISCHARGE SUPPORT TEAM (NETWORK) [phone]   PARENT CHILD HEALTH (Maternity/NICU/Pediatrics) [phone]   [facility] [phone]   [facility] [phone]   [facility] [phone]   [facility] [phone]   [facility] [phone]   [facility] [phone]   [facility] [phone]   [facility] [phone]   [facility] [phone]   [facility] [phone]   [facility] [phone]   [facility] [phone]

## 2024-08-03 NOTE — ASSESSMENT & PLAN NOTE
Presents with severe epigastric abdominal pain, nausea and vomiting beginning on Tuesday  Lipase elevated 373 however CT scan negative for acute pancreatitis  Recently treated with Paxlovid for COVID(was on last day at the time of onset of symptoms) and taking semaglutide for weight loss(for last 5 weeks)  Denies any further episode of nausea vomiting but still complains of abdominal pain.  Tolerating clear liquid diet well.  Advance to full liquid toast and crackers diet.  Continue to monitor serial exams, LFTs, toleration of diet.    Continue with as needed pain control  IV hydration  Likely discharge in the next 24 to 48 hours if stable.

## 2024-08-03 NOTE — PLAN OF CARE
Problem: PAIN - ADULT  Goal: Verbalizes/displays adequate comfort level or baseline comfort level  Description: Interventions:  - Encourage patient to monitor pain and request assistance  - Assess pain using appropriate pain scale  - Administer analgesics based on type and severity of pain and evaluate response  - Implement non-pharmacological measures as appropriate and evaluate response  - Consider cultural and social influences on pain and pain management  - Notify physician/advanced practitioner if interventions unsuccessful or patient reports new pain  Outcome: Progressing     Problem: SAFETY ADULT  Goal: Maintains/Returns to pre admission functional level  Description: INTERVENTIONS:  - Perform AM-PAC 6 Click Basic Mobility/ Daily Activity assessment daily.  - Set and communicate daily mobility goal to care team and patient/family/caregiver.   - Collaborate with rehabilitation services on mobility goals if consulted    - Out of bed for toileting  - Record patient progress and toleration of activity level   Outcome: Progressing

## 2024-08-03 NOTE — PROGRESS NOTES
[facility]  Progress Note  Name: Yusef Mendieta I  MRN: [id number]  Unit/Bed#: -01 I Date of Admission: 8/2/2024   Date of Service: 8/3/2024 I Hospital Day: 0    Assessment & Plan   * Acute pancreatitis  Assessment & Plan  Presents with severe epigastric abdominal pain, nausea and vomiting beginning on Tuesday  Lipase elevated 373 however CT scan negative for acute pancreatitis  Recently treated with Paxlovid for COVID(was on last day at the time of onset of symptoms) and taking semaglutide for weight loss(for last 5 weeks)  Denies any further episode of nausea vomiting but still complains of abdominal pain.  Tolerating clear liquid diet well.  Advance to full liquid toast and crackers diet.  Continue to monitor serial exams, LFTs, toleration of diet.    Continue with as needed pain control  IV hydration  Likely discharge in the next 24 to 48 hours if stable.    Essential hypertension  Assessment & Plan  PTA Diovan  Trend blood pressures    Hyperlipidemia  Assessment & Plan  Continue PTA Tricor  Update lipid panel    Morbid obesity with BMI of 60.0-69.9, adult (HCC)  Assessment & Plan  Currently following with weight management physician  Taking semaglutide and metformin in the outpatient setting  Currently on hold due to pancreatitis  Outpatient follow-up               VTE Pharmacologic Prophylaxis:   High Risk (Score >/= 5) - Pharmacological DVT Prophylaxis Ordered: heparin. Sequential Compression Devices Ordered.    Mobility:   Basic Mobility Inpatient Raw Score: 24  JH-HLM Goal: 8: Walk 250 feet or more  JH-HLM Achieved: 8: Walk 250 feet ot more  JH-HLM Goal achieved. Continue to encourage appropriate mobility.    Patient Centered Rounds: I performed bedside rounds with nursing staff today.   Discussions with Specialists or Other Care Team Provider: Discussed with nursing    Education and Discussions with Family / Patient: Patient declined call to .     Total Time  Spent on Date of Encounter in care of patient: 20 mins. This time was spent on one or more of the following: performing physical exam; counseling and coordination of care; obtaining or reviewing history; documenting in the medical record; reviewing/ordering tests, medications or procedures; communicating with other healthcare professionals and discussing with patient's family/caregivers.    Current Length of Stay: 0 day(s)  Current Patient Status: Inpatient   Certification Statement: The patient will continue to require additional inpatient hospital stay due to slow advancement of diet and monitoring of symptoms  Discharge Plan: Anticipate discharge tomorrow to home.    Code Status: Level 1 - Full Code    Subjective:   Patient seen and examined at bedside.  Reported some abdominal pain earlier but denies any further episode of nausea or vomiting.  Did not report any worsening pain after having clear liquid diet.    Objective:     Vitals:   Temp (24hrs), Av.6 °F (36.4 °C), Min:97.2 °F (36.2 °C), Max:97.9 °F (36.6 °C)    Temp:  [97.2 °F (36.2 °C)-97.9 °F (36.6 °C)] 97.2 °F (36.2 °C)  HR:  [56-85] 56  Resp:  [16-20] 18  BP: (114-192)/(53-95) 134/85  SpO2:  [89 %-97 %] 96 %  Body mass index is 61.68 kg/m².     Input and Output Summary (last 24 hours):   No intake or output data in the 24 hours ending 24 1022    Physical Exam:   Physical Exam  Constitutional:       General: He is not in acute distress.     Appearance: He is obese.   HENT:      Head: Normocephalic.      Nose: Nose normal.      Mouth/Throat:      Mouth: Mucous membranes are moist.   Eyes:      Extraocular Movements: Extraocular movements intact.      Pupils: Pupils are equal, round, and reactive to light.   Cardiovascular:      Rate and Rhythm: Normal rate and regular rhythm.   Pulmonary:      Effort: Pulmonary effort is normal.      Breath sounds: Normal breath sounds.   Abdominal:      Palpations: Abdomen is soft.      Comments: Obese abdomen.   No tenderness noted.   Musculoskeletal:      Cervical back: Neck supple.      Right lower leg: No edema.      Left lower leg: No edema.   Neurological:      General: No focal deficit present.      Mental Status: He is alert and oriented to person, place, and time. Mental status is at baseline.   Psychiatric:         Mood and Affect: Mood normal.          Additional Data:     Labs:  Results from last 7 days   Lab Units 08/03/24 0449 08/02/24  2124   WBC Thousand/uL 11.39* 13.95*   HEMOGLOBIN g/dL 11.2* 12.8   HEMATOCRIT % 36.4* 42.0   PLATELETS Thousands/uL 311 379   SEGS PCT %  --  66   LYMPHO PCT %  --  25   MONO PCT %  --  5   EOS PCT %  --  3     Results from last 7 days   Lab Units 08/03/24  0449   SODIUM mmol/L 137   POTASSIUM mmol/L 3.7   CHLORIDE mmol/L 104   CO2 mmol/L 30   BUN mg/dL 17   CREATININE mg/dL 0.88   ANION GAP mmol/L 3*   CALCIUM mg/dL 8.4   ALBUMIN g/dL 3.8   TOTAL BILIRUBIN mg/dL 0.88   ALK PHOS U/L 35   ALT U/L 38   AST U/L 20   GLUCOSE RANDOM mg/dL 89                 Results from last 7 days   Lab Units 08/02/24 2124 08/01/24  1604   LACTIC ACID mmol/L 0.9 1.0       Lines/Drains:  Invasive Devices       Peripheral Intravenous Line  Duration             Peripheral IV 08/02/24 Left Antecubital <1 day                          Imaging: Reviewed radiology reports from this admission including: abdominal/pelvic CT    Recent Cultures (last 7 days):         Last 24 Hours Medication List:   Current Facility-Administered Medications   Medication Dose Route Frequency Provider Last Rate    acetaminophen  975 mg Oral Q8H PRN Latonya S Michael, CRNP      DULoxetine  60 mg Oral Daily Latonya S Michael, CRNP      enoxaparin  40 mg Subcutaneous Daily Latonya S Michael, CRNP      fenofibrate  145 mg Oral Daily Latonya S Michael, CRNP      HYDROmorphone  0.5 mg Intravenous Q3H PRN Latonya S Michael, CRNP      ketorolac  15 mg Intravenous Q6H PRN Latonya S Michael, CRNP      losartan  100 mg Oral Daily Latonya S Michael, CRNP       oxyCODONE  5 mg Oral Q6H PRN Latonya S Michael, CRNP      pantoprazole  40 mg Intravenous Q24H CHATA Latonya S Michael, CRNP      sodium chloride  75 mL/hr Intravenous Continuous Tina Thomson MD 75 mL/hr (08/03/24 1018)    traZODone  100 mg Oral HS Latonya S Michael, CRNP          Today, Patient Was Seen By: Tina Thomson MD    **Please Note: This note may have been constructed using a voice recognition system.**

## 2024-08-03 NOTE — PLAN OF CARE
Problem: PAIN - ADULT  Goal: Verbalizes/displays adequate comfort level or baseline comfort level  Description: Interventions:  - Encourage patient to monitor pain and request assistance  - Assess pain using appropriate pain scale  - Administer analgesics based on type and severity of pain and evaluate response  - Implement non-pharmacological measures as appropriate and evaluate response  - Consider cultural and social influences on pain and pain management  - Notify physician/advanced practitioner if interventions unsuccessful or patient reports new pain  Outcome: Progressing     Problem: INFECTION - ADULT  Goal: Absence or prevention of progression during hospitalization  Description: INTERVENTIONS:  - Assess and monitor for signs and symptoms of infection  - Monitor lab/diagnostic results  - Monitor all insertion sites, i.e. indwelling lines, tubes, and drains  - Monitor endotracheal if appropriate and nasal secretions for changes in amount and color  - [place] appropriate cooling/warming therapies per order  - Administer medications as ordered  - Instruct and encourage patient and family to use good hand hygiene technique  - Identify and instruct in appropriate isolation precautions for identified infection/condition  Outcome: Progressing  Goal: Absence of fever/infection during neutropenic period  Description: INTERVENTIONS:  - Monitor WBC    Outcome: Progressing     Problem: SAFETY ADULT  Goal: Patient will remain free of falls  Description: INTERVENTIONS:  - Educate patient/family on patient safety including physical limitations  - Instruct patient to call for assistance with activity   - Consult OT/PT to assist with strengthening/mobility   - Keep Call bell within reach  - Keep bed low and locked with side rails adjusted as appropriate  - Keep care items and personal belongings within reach  - Initiate and maintain comfort rounds  - Make Fall Risk Sign visible to staff  - Offer Toileting every 2 Hours,  in advance of need  - Initiate/Maintain alarm  - Obtain necessary fall risk management equipment  - Apply yellow socks and bracelet for high fall risk patients  - Consider moving patient to room near nurses station  Outcome: Progressing  Goal: Maintain or return to baseline ADL function  Description: INTERVENTIONS:  -  Assess patient's ability to carry out ADLs; assess patient's baseline for ADL function and identify physical deficits which impact ability to perform ADLs (bathing, care of mouth/teeth, toileting, grooming, dressing, etc.)  - Assess/evaluate cause of self-care deficits   - Assess range of motion  - Assess patient's mobility; develop plan if impaired  - Assess patient's need for assistive devices and provide as appropriate  - Encourage maximum independence but intervene and supervise when necessary  - Involve family in performance of ADLs  - Assess for home care needs following discharge   - Consider OT consult to assist with ADL evaluation and planning for discharge  - Provide patient education as appropriate  Outcome: Progressing  Goal: Maintains/Returns to pre admission functional level  Description: INTERVENTIONS:  - Perform AM-PAC 6 Click Basic Mobility/ Daily Activity assessment daily.  - Set and communicate daily mobility goal to care team and patient/family/caregiver.   - Collaborate with rehabilitation services on mobility goals if consulted  - Reposition patient every 2 hours.  - Stand patient 3 times a day  - Ambulate patient 3 times a day  - Out of bed to chair 3 times a day   - Out of bed for meals 3 times a day  - Out of bed for toileting  - Record patient progress and toleration of activity level   Outcome: Progressing     Problem: DISCHARGE PLANNING  Goal: Discharge to home or other facility with appropriate resources  Description: INTERVENTIONS:  - Identify barriers to discharge w/patient and caregiver  - Arrange for needed discharge resources and transportation as appropriate  -  Identify discharge learning needs (meds, wound care, etc.)  - Arrange for interpretive services to assist at discharge as needed  - Refer to Case Management Department for coordinating discharge planning if the patient needs post-hospital services based on physician/advanced practitioner order or complex needs related to functional status, cognitive ability, or social support system  Outcome: Progressing     Problem: Knowledge Deficit  Goal: Patient/family/caregiver demonstrates understanding of disease process, treatment plan, medications, and discharge instructions  Description: Complete learning assessment and assess knowledge base.  Interventions:  - Provide teaching at level of understanding  - Provide teaching via preferred learning methods  Outcome: Progressing

## 2024-08-04 VITALS
DIASTOLIC BLOOD PRESSURE: 77 MMHG | TEMPERATURE: 97.3 F | RESPIRATION RATE: 17 BRPM | HEIGHT: 70 IN | SYSTOLIC BLOOD PRESSURE: 153 MMHG | BODY MASS INDEX: 45.1 KG/M2 | WEIGHT: 315 LBS | HEART RATE: 64 BPM | OXYGEN SATURATION: 92 %

## 2024-08-04 LAB
ALBUMIN SERPL BCG-MCNC: 3.7 G/DL (ref 3.5–5)
ALP SERPL-CCNC: 41 U/L (ref 34–104)
ALT SERPL W P-5'-P-CCNC: 51 U/L (ref 7–52)
ANION GAP SERPL CALCULATED.3IONS-SCNC: 4 MMOL/L (ref 4–13)
AST SERPL W P-5'-P-CCNC: 30 U/L (ref 13–39)
BASOPHILS # BLD AUTO: 0.04 THOUSANDS/ÂΜL (ref 0–0.1)
BASOPHILS NFR BLD AUTO: 1 % (ref 0–1)
BILIRUB SERPL-MCNC: 0.77 MG/DL (ref 0.2–1)
BUN SERPL-MCNC: 13 MG/DL (ref 5–25)
CALCIUM SERPL-MCNC: 8.4 MG/DL (ref 8.4–10.2)
CHLORIDE SERPL-SCNC: 103 MMOL/L (ref 96–108)
CO2 SERPL-SCNC: 29 MMOL/L (ref 21–32)
CREAT SERPL-MCNC: 0.91 MG/DL (ref 0.6–1.3)
EOSINOPHIL # BLD AUTO: 0.36 THOUSAND/ÂΜL (ref 0–0.61)
EOSINOPHIL NFR BLD AUTO: 4 % (ref 0–6)
ERYTHROCYTE [DISTWIDTH] IN BLOOD BY AUTOMATED COUNT: 15.9 % (ref 11.6–15.1)
GFR SERPL CREATININE-BSD FRML MDRD: 105 ML/MIN/1.73SQ M
GLUCOSE SERPL-MCNC: 88 MG/DL (ref 65–140)
HCT VFR BLD AUTO: 37.8 % (ref 36.5–49.3)
HGB BLD-MCNC: 11.3 G/DL (ref 12–17)
IMM GRANULOCYTES # BLD AUTO: 0.11 THOUSAND/UL (ref 0–0.2)
IMM GRANULOCYTES NFR BLD AUTO: 1 % (ref 0–2)
LIPASE SERPL-CCNC: 127 U/L (ref 11–82)
LYMPHOCYTES # BLD AUTO: 2.49 THOUSANDS/ÂΜL (ref 0.6–4.47)
LYMPHOCYTES NFR BLD AUTO: 30 % (ref 14–44)
MCH RBC QN AUTO: 19.4 PG (ref 26.8–34.3)
MCHC RBC AUTO-ENTMCNC: 29.9 G/DL (ref 31.4–37.4)
MCV RBC AUTO: 65 FL (ref 82–98)
MONOCYTES # BLD AUTO: 0.5 THOUSAND/ÂΜL (ref 0.17–1.22)
MONOCYTES NFR BLD AUTO: 6 % (ref 4–12)
NEUTROPHILS # BLD AUTO: 4.95 THOUSANDS/ÂΜL (ref 1.85–7.62)
NEUTS SEG NFR BLD AUTO: 58 % (ref 43–75)
NRBC BLD AUTO-RTO: 0 /100 WBCS
PLATELET # BLD AUTO: 303 THOUSANDS/UL (ref 149–390)
PMV BLD AUTO: 9.5 FL (ref 8.9–12.7)
POTASSIUM SERPL-SCNC: 3.9 MMOL/L (ref 3.5–5.3)
PROT SERPL-MCNC: 6.2 G/DL (ref 6.4–8.4)
RBC # BLD AUTO: 5.81 MILLION/UL (ref 3.88–5.62)
SODIUM SERPL-SCNC: 136 MMOL/L (ref 135–147)
WBC # BLD AUTO: 8.45 THOUSAND/UL (ref 4.31–10.16)

## 2024-08-04 PROCEDURE — 99239 HOSP IP/OBS DSCHRG MGMT >30: CPT | Performed by: INTERNAL MEDICINE

## 2024-08-04 PROCEDURE — 83690 ASSAY OF LIPASE: CPT | Performed by: INTERNAL MEDICINE

## 2024-08-04 PROCEDURE — 85025 COMPLETE CBC W/AUTO DIFF WBC: CPT | Performed by: INTERNAL MEDICINE

## 2024-08-04 PROCEDURE — 80053 COMPREHEN METABOLIC PANEL: CPT | Performed by: INTERNAL MEDICINE

## 2024-08-04 RX ORDER — ACETAMINOPHEN 325 MG/1
650 TABLET ORAL EVERY 6 HOURS PRN
Qty: 30 TABLET | Refills: 0 | Status: SHIPPED | OUTPATIENT
Start: 2024-08-04

## 2024-08-04 RX ADMIN — FENOFIBRATE 145 MG: 145 TABLET, FILM COATED ORAL at 08:12

## 2024-08-04 RX ADMIN — OXYCODONE HYDROCHLORIDE 5 MG: 5 TABLET ORAL at 05:16

## 2024-08-04 RX ADMIN — LOSARTAN POTASSIUM 100 MG: 50 TABLET, FILM COATED ORAL at 08:12

## 2024-08-04 RX ADMIN — PANTOPRAZOLE SODIUM 40 MG: 40 INJECTION, POWDER, LYOPHILIZED, FOR SOLUTION INTRAVENOUS at 08:14

## 2024-08-04 RX ADMIN — ENOXAPARIN SODIUM 40 MG: 40 INJECTION SUBCUTANEOUS at 08:12

## 2024-08-04 RX ADMIN — DULOXETINE HYDROCHLORIDE 60 MG: 60 CAPSULE, DELAYED RELEASE ORAL at 08:12

## 2024-08-04 NOTE — NURSING NOTE
Patient's IV removed and catheter intact. AVS reviewed with patient including meds, diet, and follow up appts. Patient verbalized understanding and escorted out by this LPN.

## 2024-08-04 NOTE — PLAN OF CARE
Problem: PAIN - ADULT  Goal: Verbalizes/displays adequate comfort level or baseline comfort level  Description: Interventions:  - Encourage patient to monitor pain and request assistance  - Assess pain using appropriate pain scale  - Administer analgesics based on type and severity of pain and evaluate response  - Implement non-pharmacological measures as appropriate and evaluate response  - Consider cultural and social influences on pain and pain management  - Notify physician/advanced practitioner if interventions unsuccessful or patient reports new pain  Outcome: Progressing     Problem: INFECTION - ADULT  Goal: Absence or prevention of progression during hospitalization  Description: INTERVENTIONS:  - Assess and monitor for signs and symptoms of infection  - Monitor lab/diagnostic results  - Monitor all insertion sites, i.e. indwelling lines, tubes, and drains  - Monitor endotracheal if appropriate and nasal secretions for changes in amount and color  - [place] appropriate cooling/warming therapies per order  - Administer medications as ordered  - Instruct and encourage patient and family to use good hand hygiene technique  - Identify and instruct in appropriate isolation precautions for identified infection/condition  Outcome: Progressing  Goal: Absence of fever/infection during neutropenic period  Description: INTERVENTIONS:  - Monitor WBC    Outcome: Progressing     Problem: SAFETY ADULT  Goal: Patient will remain free of falls  Description: INTERVENTIONS:  - Educate patient/family on patient safety including physical limitations  - Instruct patient to call for assistance with activity   - Consult OT/PT to assist with strengthening/mobility   - Keep Call bell within reach  - Keep bed low and locked with side rails adjusted as appropriate  - Keep care items and personal belongings within reach  - Initiate and maintain comfort rounds  - Make Fall Risk Sign visible to staff  - Offer Toileting every 2 Hours,  in advance of need  - Initiate/Maintain alarm  - Obtain necessary fall risk management equipment  - Apply yellow socks and bracelet for high fall risk patients  - Consider moving patient to room near nurses station  Outcome: Progressing  Goal: Maintain or return to baseline ADL function  Description: INTERVENTIONS:  -  Assess patient's ability to carry out ADLs; assess patient's baseline for ADL function and identify physical deficits which impact ability to perform ADLs (bathing, care of mouth/teeth, toileting, grooming, dressing, etc.)  - Assess/evaluate cause of self-care deficits   - Assess range of motion  - Assess patient's mobility; develop plan if impaired  - Assess patient's need for assistive devices and provide as appropriate  - Encourage maximum independence but intervene and supervise when necessary  - Involve family in performance of ADLs  - Assess for home care needs following discharge   - Consider OT consult to assist with ADL evaluation and planning for discharge  - Provide patient education as appropriate  Outcome: Progressing  Goal: Maintains/Returns to pre admission functional level  Description: INTERVENTIONS:  - Perform AM-PAC 6 Click Basic Mobility/ Daily Activity assessment daily.  - Set and communicate daily mobility goal to care team and patient/family/caregiver.   - Collaborate with rehabilitation services on mobility goals if consulted  - Reposition patient every 2 hours.  - Stand patient 3 times a day  - Ambulate patient 3 times a day  - Out of bed to chair 3 times a day   - Out of bed for meals 3 times a day  - Out of bed for toileting  - Record patient progress and toleration of activity level   Outcome: Progressing     Problem: DISCHARGE PLANNING  Goal: Discharge to home or other facility with appropriate resources  Description: INTERVENTIONS:  - Identify barriers to discharge w/patient and caregiver  - Arrange for needed discharge resources and transportation as appropriate  -  Identify discharge learning needs (meds, wound care, etc.)  - Arrange for interpretive services to assist at discharge as needed  - Refer to Case Management Department for coordinating discharge planning if the patient needs post-hospital services based on physician/advanced practitioner order or complex needs related to functional status, cognitive ability, or social support system  Outcome: Progressing     Problem: Knowledge Deficit  Goal: Patient/family/caregiver demonstrates understanding of disease process, treatment plan, medications, and discharge instructions  Description: Complete learning assessment and assess knowledge base.  Interventions:  - Provide teaching at level of understanding  - Provide teaching via preferred learning methods  Outcome: Progressing

## 2024-08-04 NOTE — DISCHARGE SUMMARY
[facility]  Discharge- Yusef Mendieta 1985, 39 y.o. male MRN: [id number]  Unit/Bed#: -01 Encounter: [id number]  Primary Care Provider: Yadi Davis DO   Date and time admitted to hospital: 8/2/2024  9:09 PM    * Acute pancreatitis  Assessment & Plan  Presents with severe epigastric abdominal pain, nausea and vomiting beginning on Tuesday  Lipase elevated 373 however CT scan negative for acute pancreatitis  Recently treated with Paxlovid for COVID(was on last day at the time of onset of symptoms) and taking semaglutide for weight loss(for last 5 weeks)  Denies any further episode of nausea vomiting but still complains of abdominal pain.  Tolerating clear liquid diet well.  Advance to full liquid toast and crackers diet.  Patient tolerated diet well and was advanced to low-fat diet.  Continues to have no abdominal discomfort nausea vomiting.  Stable for discharge home today.  Recommended to follow-up with his primary care physician within 1 week of discharge to consider switching to a lower dose of semaglutide versus another GLP agonist.    Continue with Protonix and Carafate on discharge.  stable for discharge home today    Essential hypertension  Assessment & Plan  PTA Diovan  Trend blood pressures    Hyperlipidemia  Assessment & Plan  Continue PTA Tricor  Update lipid panel    Morbid obesity with BMI of 60.0-69.9, adult (HCC)  Assessment & Plan  Currently following with weight management physician  Taking semaglutide and metformin in the outpatient setting  Currently on hold due to pancreatitis  Outpatient follow-up        Medical Problems       Resolved Problems  Date Reviewed: 12/22/2023   None       Discharging Physician / Practitioner: Tina Thomson MD  PCP: Yadi Davis DO  Admission Date:   Admission Orders (From admission, onward)       Ordered        08/03/24 1014  INPATIENT ADMISSION  Once            08/02/24 2235  Place in Observation  Once          "                 Discharge Date: 08/04/24    Consultations During Hospital Stay:  NA    Procedures Performed:   NA    Significant Findings / Test Results:   NA    Incidental Findings:   NA       Test Results Pending at Discharge (will require follow up):   NA     Outpatient Tests Requested:  NA    Complications:  None     Reason for Admission: Abdominal pain    Hospital Course:   Yusef Mendieta is a 39 y.o. male patient who originally presented to the hospital on 8/2/2024 due to abdominal pain nausea and vomiting.  Recent outpatient CT scan was negative for any acute finding including pancreatitis.  Ultrasound of the right upper quadrant showed fatty liver.  LFTs were within normal limits.  Lipase was elevated.  Recently completed Paxil bed and was on semaglutide for weight loss.  Managed with n.p.o. and gradual advancement of diet, IV hydration and analgesics.  Subsequently pain had resolved and patient was tolerating low-fat diet well.  Discharged on Protonix and Carafate with outpatient close follow-up with primary care physician within 1 week of discharge.            Please see above list of diagnoses and related plan for additional information.     Condition at Discharge: stable    Discharge Day Visit / Exam:   Subjective: Seen and examined at bedside.  Abdominal pain has resolved.  No acute events overnight.  Tolerating current consistency diet well without any nausea vomiting or worsening discomfort.  Vitals: Blood Pressure: 153/77 (08/04/24 0707)  Pulse: 64 (08/04/24 0707)  Temperature: (!) 97.3 °F (36.3 °C) (08/04/24 0707)  Temp Source: Temporal (08/03/24 0023)  Respirations: 17 (08/04/24 0707)  Height: 5' 10\" (177.8 cm) (08/02/24 2110)  Weight - Scale: (!) 195 kg (429 lb 14.4 oz) (08/02/24 2110)  SpO2: 92 % (08/04/24 0900)  Exam:   Physical Exam  Constitutional:       General: He is not in acute distress.     Appearance: He is obese.   HENT:      Head: Normocephalic and atraumatic.      Mouth/Throat:      " Mouth: Mucous membranes are moist.   Eyes:      Pupils: Pupils are equal, round, and reactive to light.   Cardiovascular:      Rate and Rhythm: Normal rate and regular rhythm.   Pulmonary:      Effort: Pulmonary effort is normal.   Abdominal:      General: Bowel sounds are normal. There is no distension.      Palpations: Abdomen is soft.      Tenderness: There is no abdominal tenderness.   Musculoskeletal:      Right lower leg: No edema.      Left lower leg: No edema.   Skin:     General: Skin is warm.   Neurological:      General: No focal deficit present.      Mental Status: He is alert and oriented to person, place, and time. Mental status is at baseline.          Discussion with Family: Updated  (significant other) via phone.    Discharge instructions/Information to patient and family:   See after visit summary for information provided to patient and family.      Provisions for Follow-Up Care:  See after visit summary for information related to follow-up care and any pertinent home health orders.      Mobility at time of Discharge:   Basic Mobility Inpatient Raw Score: 24  JH-HLM Goal: 8: Walk 250 feet or more  JH-HLM Achieved: 8: Walk 250 feet ot more  HLM Goal achieved. Continue to encourage appropriate mobility.     Disposition:   Home    Planned Readmission: no     Discharge Statement:  I spent 30 minutes discharging the patient. This time was spent on the day of discharge. I had direct contact with the patient on the day of discharge. Greater than 50% of the total time was spent examining patient, answering all patient questions, arranging and discussing plan of care with patient as well as directly providing post-discharge instructions.  Additional time then spent on discharge activities.    Discharge Medications:  See after visit summary for reconciled discharge medications provided to patient and/or family.      **Please Note: This note may have been constructed using a voice recognition  system**

## 2024-08-04 NOTE — ASSESSMENT & PLAN NOTE
Presents with severe epigastric abdominal pain, nausea and vomiting beginning on Tuesday  Lipase elevated 373 however CT scan negative for acute pancreatitis  Recently treated with Paxlovid for COVID(was on last day at the time of onset of symptoms) and taking semaglutide for weight loss(for last 5 weeks)  Denies any further episode of nausea vomiting but still complains of abdominal pain.  Tolerating clear liquid diet well.  Advance to full liquid toast and crackers diet.  Patient tolerated diet well and was advanced to low-fat diet.  Continues to have no abdominal discomfort nausea vomiting.  Stable for discharge home today.  Recommended to follow-up with his primary care physician within 1 week of discharge to consider switching to a lower dose of semaglutide versus another GLP agonist.    Continue with Protonix and Carafate on discharge.  stable for discharge home today

## 2024-08-04 NOTE — UTILIZATION REVIEW
Continued Stay Review - admitted as OBS 8/2/24 @ 2235 due to pancreatitis. Converted to Inpatient 8/3/24 @ 1014 due to need for slow advancement of diet and monitoring of symptoms.     Date:     8/3/24        Current Patient Class: Inpatient  Current Level of Care: Med Surg    HPI:39 y.o. male initially admitted as OBS on 8/2/24, converted to Inpatient 8/3/24.    8/3 Internal Medicine:  Denies any further episode of nausea vomiting but still complains of abdominal pain.  Tolerating clear liquid diet well.  Advance to full liquid toast and crackers diet.  Continue to monitor serial exams, LFTs, toleration of diet.   Continue with as needed pain control. IV hydration.     8/4 Internal Medicine:  Denies any further episode of nausea vomiting but still complains of abdominal pain.  Tolerating clear liquid diet well.  Advance to full liquid toast and crackers diet.  Patient tolerated diet well and was advanced to low-fat diet.  Continues to have no abdominal discomfort nausea vomiting.  Stable for discharge home today.  Recommended to follow-up with his primary care physician within 1 week of discharge to consider switching to a lower dose of semaglutide versus another GLP agonist.        8/4 1233 Discharged to home/self care.       Vital Signs (last 3 days) before discharge       Date/Time Temp Pulse Resp BP MAP (mmHg) SpO2 O2 Device Patient Position - Orthostatic VS Michael Coma Scale Score Pain    08/04/24 0900 -- -- -- -- -- 92 % None (Room air) -- 15 No Pain    08/04/24 07:07:46 97.3 °F (36.3 °C) 64 17 153/77 102 95 % -- -- -- --    08/04/24 0516 -- -- -- -- -- -- None (Room air) -- -- 6    08/03/24 23:25:27 97.5 °F (36.4 °C) 62 16 143/80 101 95 % -- -- -- --    08/03/24 2035 -- -- -- -- -- -- None (Room air) -- -- --    08/03/24 1956 -- -- -- -- -- -- None (Room air) -- -- 7    08/03/24 1923 -- -- -- -- -- -- None (Room air) -- 15 --    08/03/24 1828 -- -- -- -- -- -- -- -- -- 6    08/03/24 14:48:26 96.8 °F (36 °C)  89 16 174/97 123 93 % -- -- -- --    08/03/24 1204 -- -- -- -- -- -- -- -- -- 6          Weight (last 2 days) before discharge       Date/Time Weight    08/02/24 2110 195 (429.9)              Pertinent Labs/Diagnostic Results:         Results from last 7 days   Lab Units 08/04/24  0454 08/03/24  0449 08/02/24  2124 08/01/24  1604   WBC Thousand/uL 8.45 11.39* 13.95* 17.12*   HEMOGLOBIN g/dL 11.3* 11.2* 12.8 13.1   HEMATOCRIT % 37.8 36.4* 42.0 43.4   PLATELETS Thousands/uL 303 311 379 421*   TOTAL NEUT ABS Thousands/µL 4.95  --  9.19* 10.70*         Results from last 7 days   Lab Units 08/04/24  0454   SODIUM mmol/L 136   POTASSIUM mmol/L 3.9   CHLORIDE mmol/L 103   CO2 mmol/L 29   ANION GAP mmol/L 4   BUN mg/dL 13   CREATININE mg/dL 0.91   EGFR ml/min/1.73sq m 105   CALCIUM mg/dL 8.4   MAGNESIUM mg/dL  --    PHOSPHORUS mg/dL  --      Results from last 7 days   Lab Units 08/04/24  0454   AST U/L 30   ALT U/L 51   ALK PHOS U/L 41   TOTAL PROTEIN g/dL 6.2*   ALBUMIN g/dL 3.7   TOTAL BILIRUBIN mg/dL 0.77         Results from last 7 days   Lab Units 08/04/24  0454   GLUCOSE RANDOM mg/dL 88             Results from last 7 days   Lab Units 08/04/24  0454   LIPASE u/L 127*               Medications:   Scheduled Medications:    Scheduled Medications:     DULoxetine, 60 mg, Oral, Daily  enoxaparin, 40 mg, Subcutaneous, Daily  fenofibrate, 145 mg, Oral, Daily  losartan, 100 mg, Oral, Daily  pantoprazole, 40 mg, Intravenous, Q24H CHATA  traZODone, 100 mg, Oral, HS             Continuous IV Infusions:     sodium chloride 0.9 % infusion  Rate: 75 mL/hr Dose: 75 mL/hr  Freq: Continuous Route: IV  Indications of Use: IV Hydration  Last Dose: Stopped (08/04/24 0917)  Start: 08/02/24 2245 End: 08/04/24 0918        PRN Meds:  acetaminophen, 975 mg, Oral, Q8H PRN  HYDROmorphone, 0.5 mg, Intravenous, Q3H PRN   ketorolac, 15 mg, Intravenous, Q6H PRN x 1 dose 8/3 @ 1956  oxyCODONE, 5 mg, Oral, Q6H PRN x 2 doses 8/3 @ 1204, 1828, x 1 dose  8/4 @ 0516            Network Utilization Review Department  ATTENTION: Please call with any questions or concerns to [phone] and carefully listen to the prompts so that you are directed to the right person. All voicemails are confidential.   For Discharge needs, contact Care Management DC Support Team at [phone] opt. 2  Send all requests for admission clinical reviews, approved or denied determinations and any other requests to dedicated fax number below belonging to the campus where the patient is receiving treatment. List of dedicated fax numbers for the Facilities:  FACILITY NAME UR FAX NUMBER   ADMISSION DENIALS (Administrative/Medical Necessity) [phone]   DISCHARGE SUPPORT TEAM (NETWORK) [phone]   PARENT CHILD HEALTH (Maternity/NICU/Pediatrics) [phone]   [facility] [phone]   [facility] [phone]   [facility] [phone]   [facility] [phone]   [facility] [phone]   [facility] [phone]   [facility] [phone]   [facility] [phone]   [facility] [phone]   [facility] [phone]   [facility] [phone]   [facility] [phone]

## 2024-10-25 ENCOUNTER — APPOINTMENT (EMERGENCY)
Dept: RADIOLOGY | Facility: HOSPITAL | Age: 39
End: 2024-10-25
Payer: COMMERCIAL

## 2024-10-25 ENCOUNTER — HOSPITAL ENCOUNTER (EMERGENCY)
Facility: HOSPITAL | Age: 39
Discharge: HOME | End: 2024-10-25
Attending: EMERGENCY MEDICINE
Payer: COMMERCIAL

## 2024-10-25 VITALS
RESPIRATION RATE: 18 BRPM | TEMPERATURE: 98.7 F | DIASTOLIC BLOOD PRESSURE: 88 MMHG | OXYGEN SATURATION: 96 % | SYSTOLIC BLOOD PRESSURE: 181 MMHG | HEART RATE: 69 BPM

## 2024-10-25 DIAGNOSIS — L03.119 CELLULITIS OF HAND: ICD-10-CM
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
## 2024-10-25 PROCEDURE — 99283 EMERGENCY DEPT VISIT LOW MDM: CPT

## 2024-10-25 PROCEDURE — 73130 X-RAY EXAM OF HAND: CPT

## 2024-10-25 PROCEDURE — 99284 EMERGENCY DEPT VISIT MOD MDM: CPT | Performed by: EMERGENCY MEDICINE

## 2024-10-25 RX ADMIN — AMOXICILLIN AND CLAVULANATE POTASSIUM 1 TABLET: 875; 125 TABLET, FILM COATED ORAL at 12:11

## 2024-10-25 NOTE — ED PROVIDER NOTES
Time reflects when diagnosis was documented in both MDM as applicable and the Disposition within this note       Time User Action Codes Description Comment    10/25/2024 12:02 PM Nuno Glez Add [W54.0XXA] Dog bite, initial encounter     10/25/2024 12:02 PM Nuno Glez Add [L03.119] Cellulitis of hand     10/25/2024 12:02 PM Nuno Glez Modify [L03.119] Cellulitis of hand Right          ED Disposition       ED Disposition   Discharge    Condition   Stable    Date/Time   Fri Oct 25, 2024 12:02 PM    Comment   Yusef Mendieta discharge to home/self care.                   Assessment & Plan       Medical Decision Making  Amount and/or Complexity of Data Reviewed  Radiology: ordered and independent interpretation performed. Decision-making details documented in ED Course.    Risk  Prescription drug management.        ED Course as of 10/25/24 1203   Fri Oct 25, 2024   1202 No palpable abscess.  Patient with full range of motion of the fingers and wrist without any pain.  Doubt tenosynovitis at this time.       Medications - No data to display    ED Risk Strat Scores                           SBIRT 22yo+      Flowsheet Row Most Recent Value   Initial Alcohol Screen: US AUDIT-C     1. How often do you have a drink containing alcohol? 0 Filed at: 10/25/2024 1130   2. How many drinks containing alcohol do you have on a typical day you are drinking?  0 Filed at: 10/25/2024 1130   3a. Male UNDER 65: How often do you have five or more drinks on one occasion? 0 Filed at: 10/25/2024 1130   Audit-C Score 0 Filed at: 10/25/2024 1130   ALEXANDER: How many times in the past year have you...    Used an illegal drug or used a prescription medication for non-medical reasons? Never Filed at: 10/25/2024 1130                            History of Present Illness       Chief Complaint   Patient presents with    Dog Bite     C/o dog bite to R hand on Wednesday and now has pain/swelling radiating up R arm. Dog UTD on shots; pt UTD  on tetanus       Past Medical History:   Diagnosis Date    Anxiety     Hypertension     Thalassemia       Past Surgical History:   Procedure Laterality Date    HERNIA REPAIR        Family History   Problem Relation Age of Onset    Hypertension Father       Social History     Tobacco Use    Smoking status: Never    Smokeless tobacco: Never   Vaping Use    Vaping status: Never Used   Substance Use Topics    Alcohol use: Yes     Comment: occasionally    Drug use: Never      E-Cigarette/Vaping    E-Cigarette Use Never User       E-Cigarette/Vaping Substances    Nicotine No     THC No     CBD No     Flavoring No     Other No     Unknown No       I have reviewed and agree with the history as documented.     Patient was bitten by relatives dog on Wednesday.  Took 3 doses of amoxicillin.  Complains of continued swelling and pain.  Complains of numbness at the bite site.  No fevers or chills.  Dog is up-to-date with his immunizations.  Tetanus status up-to-date.  No nausea vomiting or diarrhea.      History provided by:  Patient   used: No    Dog Bite  Contact animal:  Dog  Animal bite location: Right hand.  Time since incident:  2 days  Pain details:     Quality:  Aching    Severity:  Mild    Timing:  Constant    Progression:  Worsening  Incident location:  Work  Animal's rabies vaccination status:  Up to date  Animal in possession: yes    Tetanus status:  Up to date  Relieved by:  Nothing  Worsened by:  Nothing  Ineffective treatments:  None tried  Associated symptoms: swelling    Associated symptoms: no fever and no rash        Review of Systems   Constitutional:  Negative for chills and fever.   HENT:  Negative for ear pain, hearing loss, sore throat, trouble swallowing and voice change.    Eyes:  Negative for pain and discharge.   Respiratory:  Negative for cough, shortness of breath and wheezing.    Cardiovascular:  Negative for chest pain and palpitations.   Gastrointestinal:  Negative for  abdominal pain, blood in stool, constipation, diarrhea, nausea and vomiting.   Genitourinary:  Negative for dysuria, flank pain, frequency and hematuria.   Musculoskeletal:  Positive for myalgias. Negative for joint swelling, neck pain and neck stiffness.   Skin:  Negative for rash and wound.   Neurological:  Negative for dizziness, seizures, syncope, facial asymmetry and headaches.   Psychiatric/Behavioral:  Negative for hallucinations, self-injury and suicidal ideas.    All other systems reviewed and are negative.          Objective       ED Triage Vitals [10/25/24 1128]   Temperature Pulse Blood Pressure Respirations SpO2 Patient Position - Orthostatic VS   98.7 °F (37.1 °C) 68 (!) 200/115 18 97 % Sitting      Temp src Heart Rate Source BP Location FiO2 (%) Pain Score    -- Monitor Left arm -- --      Vitals      Date and Time Temp Pulse SpO2 Resp BP Pain Score FACES Pain Rating User   10/25/24 1128 98.7 °F (37.1 °C) 68 97 % 18 200/115 -- -- KW            Physical Exam  Constitutional:       General: He is not in acute distress.     Appearance: Normal appearance. He is not ill-appearing.   HENT:      Head: Normocephalic and atraumatic.      Right Ear: External ear normal.      Left Ear: External ear normal.      Nose: Nose normal.      Mouth/Throat:      Mouth: Mucous membranes are moist.   Eyes:      Extraocular Movements: Extraocular movements intact.      Pupils: Pupils are equal, round, and reactive to light.   Cardiovascular:      Rate and Rhythm: Normal rate and regular rhythm.   Pulmonary:      Effort: Pulmonary effort is normal. No respiratory distress.      Breath sounds: Normal breath sounds.   Abdominal:      General: Abdomen is flat. Bowel sounds are normal. There is no distension.      Palpations: Abdomen is soft.      Tenderness: There is no abdominal tenderness.   Musculoskeletal:         General: Swelling present. No tenderness.      Cervical back: Normal range of motion and neck supple.       Comments: Swelling noted along the thenar region of the right hand and on the dorsum side of the hand between the thumb and index.  Slight swelling to the wrist.  No fluctuance.  No induration.  No pus drainage.  Wound healing.   Skin:     General: Skin is warm and dry.      Capillary Refill: Capillary refill takes less than 2 seconds.   Neurological:      General: No focal deficit present.      Mental Status: He is alert and oriented to person, place, and time.   Psychiatric:         Mood and Affect: Mood normal.         Behavior: Behavior normal.         Results Reviewed       None            XR hand 3+ views RIGHT   ED Interpretation by Nuno Glez MD (10/25 1201)   No fracture or foreign body noted.  Soft tissue swelling noted.          Procedures    ED Medication and Procedure Management   Prior to Admission Medications   Prescriptions Last Dose Informant Patient Reported? Taking?   DULoxetine (CYMBALTA) 60 mg delayed release capsule   Yes No   Sig: Take 60 mg by mouth daily   acetaminophen (TYLENOL) 325 mg tablet   No No   Sig: Take 2 tablets (650 mg total) by mouth every 6 (six) hours as needed for mild pain, headaches or fever   fenofibrate (TRICOR) 145 mg tablet   Yes No   Sig: Take 145 mg by mouth daily   ondansetron (ZOFRAN) 4 mg tablet   No No   Sig: Take 1 tablet (4 mg total) by mouth every 6 (six) hours   pantoprazole (PROTONIX) 40 mg tablet   Yes No   Sig: Take 40 mg by mouth daily   sucralfate (CARAFATE) 1 g tablet   No No   Sig: Take 1 tablet (1 g total) by mouth 4 (four) times a day for 14 days   traZODone (DESYREL) 100 mg tablet   Yes No   Sig: Take 100 mg by mouth   valsartan (DIOVAN) 320 MG tablet   Yes No   Sig: Take 320 mg by mouth daily      Facility-Administered Medications: None     Patient's Medications   Discharge Prescriptions    AMOXICILLIN-CLAVULANATE (AUGMENTIN) 875-125 MG PER TABLET    Take 1 tablet by mouth every 12 (twelve) hours for 10 days       Start Date: 10/25/2024End  Date: 11/4/2024       Order Dose: 1 tablet       Quantity: 20 tablet    Refills: 0     No discharge procedures on file.  ED SEPSIS DOCUMENTATION   Time reflects when diagnosis was documented in both MDM as applicable and the Disposition within this note       Time User Action Codes Description Comment    10/25/2024 12:02 PM Nuno Glez [W54.0XXA] Dog bite, initial encounter     10/25/2024 12:02 PM Nuno Glez Add [L03.119] Cellulitis of hand     10/25/2024 12:02 PM Nuno Glez Modify [L03.119] Cellulitis of hand Right                 Nuno Glez MD  10/25/24 1204

## 2024-10-28 ENCOUNTER — HOSPITAL ENCOUNTER (EMERGENCY)
Facility: HOSPITAL | Age: 39
Discharge: HOME | End: 2024-10-28
Attending: EMERGENCY MEDICINE

## 2024-10-28 VITALS
BODY MASS INDEX: 61.56 KG/M2 | HEART RATE: 79 BPM | DIASTOLIC BLOOD PRESSURE: 112 MMHG | RESPIRATION RATE: 20 BRPM | SYSTOLIC BLOOD PRESSURE: 180 MMHG | TEMPERATURE: 97 F | OXYGEN SATURATION: 98 % | WEIGHT: 315 LBS
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
## 2024-10-28 DIAGNOSIS — Z51.89 VISIT FOR WOUND CHECK: ICD-10-CM

## 2024-10-28 LAB
ALBUMIN SERPL BCG-MCNC: 4.6 G/DL (ref 3.5–5)
ALP SERPL-CCNC: 37 U/L (ref 34–104)
ALT SERPL W P-5'-P-CCNC: 46 U/L (ref 7–52)
ANION GAP SERPL CALCULATED.3IONS-SCNC: 8 MMOL/L (ref 4–13)
AST SERPL W P-5'-P-CCNC: 31 U/L (ref 13–39)
BASOPHILS # BLD AUTO: 0.06 THOUSANDS/ΜL (ref 0–0.1)
BASOPHILS NFR BLD AUTO: 1 % (ref 0–1)
BILIRUB SERPL-MCNC: 0.68 MG/DL (ref 0.2–1)
BUN SERPL-MCNC: 17 MG/DL (ref 5–25)
CALCIUM SERPL-MCNC: 9.3 MG/DL (ref 8.4–10.2)
CHLORIDE SERPL-SCNC: 103 MMOL/L (ref 96–108)
CO2 SERPL-SCNC: 28 MMOL/L (ref 21–32)
CREAT SERPL-MCNC: 0.83 MG/DL (ref 0.6–1.3)
EOSINOPHIL # BLD AUTO: 0.3 THOUSAND/ΜL (ref 0–0.61)
EOSINOPHIL NFR BLD AUTO: 3 % (ref 0–6)
ERYTHROCYTE [DISTWIDTH] IN BLOOD BY AUTOMATED COUNT: 17.1 % (ref 11.6–15.1)
GFR SERPL CREATININE-BSD FRML MDRD: 110 ML/MIN/1.73SQ M
GLUCOSE SERPL-MCNC: 101 MG/DL (ref 65–140)
HCT VFR BLD AUTO: 41.4 % (ref 36.5–49.3)
HGB BLD-MCNC: 12.4 G/DL (ref 12–17)
IMM GRANULOCYTES # BLD AUTO: 0.1 THOUSAND/UL (ref 0–0.2)
IMM GRANULOCYTES NFR BLD AUTO: 1 % (ref 0–2)
LACTATE SERPL-SCNC: 1.4 MMOL/L (ref 0.5–2)
LYMPHOCYTES # BLD AUTO: 2.26 THOUSANDS/ΜL (ref 0.6–4.47)
LYMPHOCYTES NFR BLD AUTO: 26 % (ref 14–44)
MCH RBC QN AUTO: 19.5 PG (ref 26.8–34.3)
MCHC RBC AUTO-ENTMCNC: 30 G/DL (ref 31.4–37.4)
MCV RBC AUTO: 65 FL (ref 82–98)
MONOCYTES # BLD AUTO: 0.36 THOUSAND/ΜL (ref 0.17–1.22)
MONOCYTES NFR BLD AUTO: 4 % (ref 4–12)
NEUTROPHILS # BLD AUTO: 5.7 THOUSANDS/ΜL (ref 1.85–7.62)
NEUTS SEG NFR BLD AUTO: 65 % (ref 43–75)
NRBC BLD AUTO-RTO: 0 /100 WBCS
PLATELET # BLD AUTO: 320 THOUSANDS/UL (ref 149–390)
PMV BLD AUTO: 9.8 FL (ref 8.9–12.7)
POTASSIUM SERPL-SCNC: 4 MMOL/L (ref 3.5–5.3)
PROT SERPL-MCNC: 7.5 G/DL (ref 6.4–8.4)
RBC # BLD AUTO: 6.36 MILLION/UL (ref 3.88–5.62)
SODIUM SERPL-SCNC: 139 MMOL/L (ref 135–147)
WBC # BLD AUTO: 8.78 THOUSAND/UL (ref 4.31–10.16)

## 2024-10-28 PROCEDURE — 87040 BLOOD CULTURE FOR BACTERIA: CPT | Performed by: EMERGENCY MEDICINE

## 2024-10-28 PROCEDURE — 99283 EMERGENCY DEPT VISIT LOW MDM: CPT

## 2024-10-28 PROCEDURE — 83605 ASSAY OF LACTIC ACID: CPT | Performed by: EMERGENCY MEDICINE

## 2024-10-28 PROCEDURE — 36415 COLL VENOUS BLD VENIPUNCTURE: CPT | Performed by: EMERGENCY MEDICINE

## 2024-10-28 PROCEDURE — 99284 EMERGENCY DEPT VISIT MOD MDM: CPT | Performed by: EMERGENCY MEDICINE

## 2024-10-28 PROCEDURE — 80053 COMPREHEN METABOLIC PANEL: CPT | Performed by: EMERGENCY MEDICINE

## 2024-10-28 PROCEDURE — 85025 COMPLETE CBC W/AUTO DIFF WBC: CPT | Performed by: EMERGENCY MEDICINE

## 2024-10-28 RX ORDER — HYDROCHLOROTHIAZIDE 25 MG/1
25 TABLET ORAL DAILY
COMMUNITY
Start: 2024-05-29 | End: 2025-09-09

## 2024-10-28 RX ORDER — CLONIDINE HYDROCHLORIDE 0.1 MG/1
0.2 TABLET ORAL ONCE
Status: COMPLETED | OUTPATIENT
Start: 2024-10-28 | End: 2024-10-28

## 2024-10-28 RX ADMIN — CLONIDINE HYDROCHLORIDE 0.2 MG: 0.1 TABLET ORAL at 13:18

## 2024-10-28 NOTE — Clinical Note
Yusef Mendieta was seen and treated in our emergency department on 10/28/2024.                Diagnosis:     Yusef  may return to work on return date.    He may return on this date: 10/30/2024         If you have any questions or concerns, please don't hesitate to call.      Christopher Loredo MD    ______________________________           _______________          _______________  Hospital Representative                              Date                                Time

## 2024-10-28 NOTE — ED PROVIDER NOTES
Time reflects when diagnosis was documented in both MDM as applicable and the Disposition within this note       Time User Action Codes Description Comment    10/28/2024  1:42 PM Christopher Loredo Add [W54.0XXA] Dog bite, initial encounter     10/28/2024  1:42 PM Christopher Loredo Remove [W54.0XXA] Dog bite, initial encounter     10/28/2024  1:42 PM Christopher Loredo Add [W54.0XXD] Dog bite, subsequent encounter     10/28/2024  1:42 PM Christopher Loredo Add [Z51.89] Visit for wound check           ED Disposition       ED Disposition   Discharge    Condition   Stable    Date/Time   Mon Oct 28, 2024  1:42 PM    Comment   Yusef Mendieta discharge to home/self care.                   Assessment & Plan       Medical Decision Making  1246: Patient sustained dog bite to his right hand 3 days prior to arrival, seen in our emergency room.  Placed on Augmentin.  Patient states his wounds have been healing up as expected however has developed some myalgias, fatigue of unclear cause.  No fevers.  Plan to check basic labs, send blood culture.  Patient is noted to have severe hypertension in triage, history of hypertension, states he is compliant with his medications.  Plan to give a dose of clonidine, reevaluate.    1400: Labs reviewed.  The patient has remained stable throughout ED course.  Continue antibiotics as previously prescribed.  Stable for discharge.    Amount and/or Complexity of Data Reviewed  External Data Reviewed: radiology.     Details: Right hand x-rays--No acute osseous abnormality.     Significant soft tissue swelling. No soft tissue gas or radiopaque foreign body.    Labs: ordered.    Risk  Prescription drug management.             Medications   cloNIDine (CATAPRES) tablet 0.2 mg (0.2 mg Oral Given 10/28/24 1318)       ED Risk Strat Scores                           SBIRT 20yo+      Flowsheet Row Most Recent Value   Initial Alcohol Screen: US AUDIT-C     1. How often do you have a drink containing alcohol? 0 Filed at:  10/28/2024 1219   2. How many drinks containing alcohol do you have on a typical day you are drinking?  0 Filed at: 10/28/2024 1219   3a. Male UNDER 65: How often do you have five or more drinks on one occasion? 0 Filed at: 10/28/2024 1219   Audit-C Score 0 Filed at: 10/28/2024 1219   ALEXANDER: How many times in the past year have you...    Used an illegal drug or used a prescription medication for non-medical reasons? Never Filed at: 10/28/2024 1219                            History of Present Illness       Chief Complaint   Patient presents with    Dog Bite     Bit last week by dog, reports increased pain, has been taking abx. Reports generalized body aches, hand is going numb. Denies sore throat, cough. Fever. Reports serosanguinous drainage from wound, swelling noted, denies fever. No erythema noted. Pain with movement of R hand        Past Medical History:   Diagnosis Date    Anxiety     Hypertension     Thalassemia       Past Surgical History:   Procedure Laterality Date    HERNIA REPAIR        Family History   Problem Relation Age of Onset    Hypertension Father       Social History     Tobacco Use    Smoking status: Never    Smokeless tobacco: Never   Vaping Use    Vaping status: Never Used   Substance Use Topics    Alcohol use: Yes     Comment: occasionally    Drug use: Never      E-Cigarette/Vaping    E-Cigarette Use Never User       E-Cigarette/Vaping Substances    Nicotine No     THC No     CBD No     Flavoring No     Other No     Unknown No       I have reviewed and agree with the history as documented.       History provided by:  Medical records, patient and spouse  Wound Check   He was treated in the ED 2 to 3 days ago. Previous treatment in the ED includes Oral antibiotics (Placed on Augmentin). Treatments since wound repair include regular soap and water washings. Fever duration: No fevers. His temperature was unmeasured prior to arrival. There has been no drainage from the wound. There is no redness  present. The swelling has improved. There is no pain present. He has no difficulty moving the affected extremity or digit.       Review of Systems   Constitutional:  Positive for fatigue. Negative for appetite change and chills.   HENT:  Negative for ear pain, rhinorrhea, sore throat and trouble swallowing.    Eyes:  Negative for pain, discharge and visual disturbance.   Respiratory:  Negative for cough, chest tightness and shortness of breath.    Cardiovascular:  Negative for chest pain and palpitations.   Gastrointestinal:  Negative for abdominal pain, nausea and vomiting.   Endocrine: Negative for polydipsia, polyphagia and polyuria.   Genitourinary:  Negative for difficulty urinating, dysuria, hematuria and testicular pain.   Musculoskeletal:  Positive for myalgias. Negative for arthralgias and back pain.   Skin:  Positive for wound. Negative for color change.   Allergic/Immunologic: Negative for immunocompromised state.   Neurological:  Negative for dizziness, seizures, syncope, weakness and headaches.   Hematological:  Negative for adenopathy.   Psychiatric/Behavioral:  Negative for confusion and dysphoric mood.    All other systems reviewed and are negative.          Objective       ED Triage Vitals   Temperature Pulse Blood Pressure Respirations SpO2 Patient Position - Orthostatic VS   10/28/24 1220 10/28/24 1220 10/28/24 1222 10/28/24 1220 10/28/24 1220 10/28/24 1220   (!) 97 °F (36.1 °C) 69 (!) 194/121 20 97 % Sitting      Temp Source Heart Rate Source BP Location FiO2 (%) Pain Score    10/28/24 1220 10/28/24 1220 10/28/24 1220 -- 10/28/24 1220    Temporal Monitor Left arm  5      Vitals      Date and Time Temp Pulse SpO2 Resp BP Pain Score FACES Pain Rating User   10/28/24 1352 -- 79 98 % 20 180/112 -- -- AS   10/28/24 1316 -- 70 97 % 20 168/110 -- -- AS   10/28/24 1222 -- -- -- -- 194/121 -- -- KK   10/28/24 1220 97 °F (36.1 °C) 69 97 % 20 -- 5 -- KK            Physical Exam  Vitals and nursing note  reviewed.   Constitutional:       General: He is not in acute distress.     Appearance: Normal appearance. He is obese. He is not ill-appearing, toxic-appearing or diaphoretic.   HENT:      Head: Normocephalic and atraumatic.      Nose: Nose normal. No congestion or rhinorrhea.      Mouth/Throat:      Mouth: Mucous membranes are moist.      Pharynx: Oropharynx is clear. No oropharyngeal exudate or posterior oropharyngeal erythema.   Eyes:      General:         Right eye: No discharge.         Left eye: No discharge.   Cardiovascular:      Rate and Rhythm: Normal rate and regular rhythm.      Pulses: Normal pulses.      Heart sounds: Normal heart sounds. No murmur heard.     No gallop.   Pulmonary:      Effort: Pulmonary effort is normal. No respiratory distress.      Breath sounds: Normal breath sounds. No stridor. No wheezing, rhonchi or rales.   Chest:      Chest wall: No tenderness.   Abdominal:      General: Bowel sounds are normal. There is no distension.      Palpations: Abdomen is soft. There is no mass.      Tenderness: There is no abdominal tenderness. There is no right CVA tenderness, left CVA tenderness, guarding or rebound.      Hernia: No hernia is present.   Musculoskeletal:         General: Normal range of motion.      Cervical back: Normal range of motion and neck supple.   Skin:     General: Skin is warm and dry.      Capillary Refill: Capillary refill takes less than 2 seconds.      Comments: Healing puncture wounds to the right thenar eminence and right dorsal hand at the first webspace from dog bite wound, no discharge, no erythema.  There are some subtle swelling to the right thenar eminence.   Neurological:      General: No focal deficit present.      Mental Status: He is alert and oriented to person, place, and time.      Cranial Nerves: No cranial nerve deficit.      Sensory: No sensory deficit.      Motor: No weakness.      Coordination: Coordination normal.      Gait: Gait normal.      Deep  Tendon Reflexes: Reflexes normal.   Psychiatric:         Mood and Affect: Mood normal.         Behavior: Behavior normal.         Thought Content: Thought content normal.         Judgment: Judgment normal.         Results Reviewed       Procedure Component Value Units Date/Time    Lactic acid, plasma (w/reflex if result > 2.0) [[id number]]  (Normal) Collected: 10/28/24 1309    Lab Status: Final result Specimen: Blood from Hand, Left Updated: 10/28/24 1338     LACTIC ACID 1.4 mmol/L     Narrative:      Result may be elevated if tourniquet was used during collection.    Comprehensive metabolic panel [[id number]] Collected: 10/28/24 1309    Lab Status: Final result Specimen: Blood from Hand, Left Updated: 10/28/24 1337     Sodium 139 mmol/L      Potassium 4.0 mmol/L      Chloride 103 mmol/L      CO2 28 mmol/L      ANION GAP 8 mmol/L      BUN 17 mg/dL      Creatinine 0.83 mg/dL      Glucose 101 mg/dL      Calcium 9.3 mg/dL      AST 31 U/L      ALT 46 U/L      Alkaline Phosphatase 37 U/L      Total Protein 7.5 g/dL      Albumin 4.6 g/dL      Total Bilirubin 0.68 mg/dL      eGFR 110 ml/min/1.73sq m     Narrative:      National Kid[employer] guidelines for Chronic Kidney Disease (CKD):     Stage 1 with normal or high GFR (GFR > 90 mL/min/1.73 square meters)    Stage 2 Mild CKD (GFR = 60-89 mL/min/1.73 square meters)    Stage 3A Moderate CKD (GFR = 45-59 mL/min/1.73 square meters)    Stage 3B Moderate CKD (GFR = 30-44 mL/min/1.73 square meters)    Stage 4 Severe CKD (GFR = 15-29 mL/min/1.73 square meters)    Stage 5 End Stage CKD (GFR <15 mL/min/1.73 square meters)  Note: GFR calculation is accurate only with a steady state creatinine    CBC and differential [[id number]]  (Abnormal) Collected: 10/28/24 1309    Lab Status: Final result Specimen: Blood from Hand, Left Updated: 10/28/24 1316     WBC 8.78 Thousand/uL      RBC 6.36 Million/uL      Hemoglobin 12.4 g/dL      Hematocrit 41.4 %      MCV 65 fL      MCH 19.5  pg      MCHC 30.0 g/dL      RDW 17.1 %      MPV 9.8 fL      Platelets 320 Thousands/uL      nRBC 0 /100 WBCs      Segmented % 65 %      Immature Grans % 1 %      Lymphocytes % 26 %      Monocytes % 4 %      Eosinophils Relative 3 %      Basophils Relative 1 %      Absolute Neutrophils 5.70 Thousands/µL      Absolute Immature Grans 0.10 Thousand/uL      Absolute Lymphocytes 2.26 Thousands/µL      Absolute Monocytes 0.36 Thousand/µL      Eosinophils Absolute 0.30 Thousand/µL      Basophils Absolute 0.06 Thousands/µL     Blood culture [[id number]] Collected: 10/28/24 1309    Lab Status: In process Specimen: Blood from Hand, Left Updated: 10/28/24 1313            No orders to display       Procedures    ED Medication and Procedure Management   Prior to Admission Medications   Prescriptions Last Dose Informant Patient Reported? Taking?   DULoxetine (CYMBALTA) 60 mg delayed release capsule   Yes No   Sig: Take 60 mg by mouth daily   acetaminophen (TYLENOL) 325 mg tablet   No No   Sig: Take 2 tablets (650 mg total) by mouth every 6 (six) hours as needed for mild pain, headaches or fever   amoxicillin-clavulanate (AUGMENTIN) 875-125 mg per tablet   No No   Sig: Take 1 tablet by mouth every 12 (twelve) hours for 10 days   fenofibrate (TRICOR) 145 mg tablet   Yes No   Sig: Take 145 mg by mouth daily   hydroCHLOROthiazide 25 mg tablet 10/28/2024  Yes Yes   Sig: Take 25 mg by mouth daily   ondansetron (ZOFRAN) 4 mg tablet   No No   Sig: Take 1 tablet (4 mg total) by mouth every 6 (six) hours   pantoprazole (PROTONIX) 40 mg tablet   Yes No   Sig: Take 40 mg by mouth daily   sucralfate (CARAFATE) 1 g tablet   No No   Sig: Take 1 tablet (1 g total) by mouth 4 (four) times a day for 14 days   traZODone (DESYREL) 100 mg tablet   Yes No   Sig: Take 100 mg by mouth   valsartan (DIOVAN) 320 MG tablet 10/28/2024  Yes Yes   Sig: Take 320 mg by mouth daily      Facility-Administered Medications: None     Discharge Medication List as of  10/28/2024  1:42 PM        CONTINUE these medications which have NOT CHANGED    Details   hydroCHLOROthiazide 25 mg tablet Take 25 mg by mouth daily, Starting Wed 5/29/2024, Until Tue 9/9/2025, Historical Med      valsartan (DIOVAN) 320 MG tablet Take 320 mg by mouth daily, Starting Wed 8/23/2023, Until Mon 10/28/2024, Historical Med      acetaminophen (TYLENOL) 325 mg tablet Take 2 tablets (650 mg total) by mouth every 6 (six) hours as needed for mild pain, headaches or fever, Starting Sun 8/4/2024, Normal      amoxicillin-clavulanate (AUGMENTIN) 875-125 mg per tablet Take 1 tablet by mouth every 12 (twelve) hours for 10 days, Starting Fri 10/25/2024, Until Mon 11/4/2024, Normal      DULoxetine (CYMBALTA) 60 mg delayed release capsule Take 60 mg by mouth daily, Starting Tue 8/29/2023, Historical Med      fenofibrate (TRICOR) 145 mg tablet Take 145 mg by mouth daily, Starting Mon 6/26/2023, Until Tue 6/25/2024, Historical Med      ondansetron (ZOFRAN) 4 mg tablet Take 1 tablet (4 mg total) by mouth every 6 (six) hours, Starting Thu 8/1/2024, Normal      pantoprazole (PROTONIX) 40 mg tablet Take 40 mg by mouth daily, Starting Wed 8/23/2023, Until Thu 8/22/2024, Historical Med      sucralfate (CARAFATE) 1 g tablet Take 1 tablet (1 g total) by mouth 4 (four) times a day for 14 days, Starting Thu 8/1/2024, Until Thu 8/15/2024, Normal      traZODone (DESYREL) 100 mg tablet Take 100 mg by mouth, Starting Tue 8/29/2023, Historical Med           No discharge procedures on file.  ED SEPSIS DOCUMENTATION   Time reflects when diagnosis was documented in both MDM as applicable and the Disposition within this note       Time User Action Codes Description Comment    10/28/2024  1:42 PM Christopher Loredo Add [W54.0XXA] Dog bite, initial encounter     10/28/2024  1:42 PM Christopher Loredo Remove [W54.0XXA] Dog bite, initial encounter     10/28/2024  1:42 PM Christopher Loredo Add [W54.0XXD] Dog bite, subsequent encounter     10/28/2024   1:42 PM Christopher Loredo Add [Z51.89] Visit for wound check                  Christopher Loredo MD  10/28/24 2218

## 2024-11-02 LAB — BACTERIA BLD CULT: NORMAL

## 2024-11-05 ENCOUNTER — OFFICE VISIT (OUTPATIENT)
Dept: URGENT CARE | Facility: CLINIC | Age: 39
End: 2024-11-05
Payer: COMMERCIAL

## 2024-11-05 VITALS
TEMPERATURE: 97.1 F | HEART RATE: 65 BPM | WEIGHT: 315 LBS | DIASTOLIC BLOOD PRESSURE: 86 MMHG | OXYGEN SATURATION: 95 % | RESPIRATION RATE: 16 BRPM | HEIGHT: 70 IN | BODY MASS INDEX: 45.1 KG/M2 | SYSTOLIC BLOOD PRESSURE: 138 MMHG

## 2024-11-05 DIAGNOSIS — M54.41 ACUTE BILATERAL LOW BACK PAIN WITH RIGHT-SIDED SCIATICA: Primary | ICD-10-CM

## 2024-11-05 PROCEDURE — 96372 THER/PROPH/DIAG INJ SC/IM: CPT

## 2024-11-05 PROCEDURE — G0382 LEV 3 HOSP TYPE B ED VISIT: HCPCS

## 2024-11-05 RX ORDER — POTASSIUM CHLORIDE 750 MG/1
10 TABLET, EXTENDED RELEASE ORAL DAILY
COMMUNITY
Start: 2024-08-08 | End: 2025-08-08

## 2024-11-05 RX ORDER — CYCLOBENZAPRINE HCL 10 MG
10 TABLET ORAL 3 TIMES DAILY PRN
Qty: 20 TABLET | Refills: 0 | Status: SHIPPED | OUTPATIENT
Start: 2024-11-05

## 2024-11-05 RX ORDER — KETOROLAC TROMETHAMINE 30 MG/ML
60 INJECTION, SOLUTION INTRAMUSCULAR; INTRAVENOUS ONCE
Status: COMPLETED | OUTPATIENT
Start: 2024-11-05 | End: 2024-11-05

## 2024-11-05 RX ADMIN — KETOROLAC TROMETHAMINE 60 MG: 30 INJECTION, SOLUTION INTRAMUSCULAR; INTRAVENOUS at 12:35

## 2024-11-05 NOTE — PROGRESS NOTES
[facility]        NAME: Yusef Mendieta is a 39 y.o. male  : 1985    MRN: [id number]  DATE: 2024  TIME: 12:41 PM    Assessment and Plan   Acute bilateral low back pain with right-sided sciatica [M54.41]  1. Acute bilateral low back pain with right-sided sciatica  ketorolac (TORADOL) injection 60 mg    cyclobenzaprine (FLEXERIL) 10 mg tablet            Patient Instructions       Follow up with PCP in 3-5 days.  Proceed to  ER if symptoms worsen.    If tests are performed, our office will contact you with results only if changes need to made to the care plan discussed with you at the visit. You can review your full results on [facility].    Chief Complaint     Chief Complaint   Patient presents with    Back Pain     Lower RIGHT back pain, shooting down right leg x 2 weeks. OTC- advil. 8/10 pain. Hx of back pain.          History of Present Illness       Lower RIGHT back pain, shooting down right leg x 2 weeks. OTC- advil. 8/10 pain. Hx of back pain.  Ambulating without difficulty.  States back pain feels similar to back pain he has had in the past.  No prior surgeries to the back.  Denies any numbness, weakness, tingling. No fevers, no IV drug use, no bilateral lower extremity weakness, urinary or fecal incontinence, no saddle anesthesia, no regular steroid use, no trauma.    Back Pain  Pertinent negatives include no chest pain or fever.       Review of Systems   Review of Systems   Constitutional:  Negative for appetite change, chills, fatigue and fever.   Respiratory:  Negative for cough, shortness of breath, wheezing and stridor.    Cardiovascular:  Negative for chest pain and palpitations.   Musculoskeletal:  Positive for back pain. Negative for gait problem and myalgias.         Current Medications       Current Outpatient Medications:     cyclobenzaprine (FLEXERIL) 10 mg tablet, Take 1 tablet (10 mg total) by mouth 3 (three) times a day as needed for muscle spasms, Disp: 20  "tablet, Rfl: 0    DULoxetine (CYMBALTA) 60 mg delayed release capsule, Take 60 mg by mouth daily, Disp: , Rfl:     fenofibrate (TRICOR) 145 mg tablet, Take 145 mg by mouth daily, Disp: , Rfl:     hydroCHLOROthiazide 25 mg tablet, Take 25 mg by mouth daily, Disp: , Rfl:     pantoprazole (PROTONIX) 40 mg tablet, Take 40 mg by mouth daily, Disp: , Rfl:     potassium chloride (Klor-Con M10) 10 mEq tablet, Take 10 mEq by mouth daily, Disp: , Rfl:     traZODone (DESYREL) 100 mg tablet, Take 100 mg by mouth, Disp: , Rfl:     valsartan (DIOVAN) 320 MG tablet, Take 320 mg by mouth daily, Disp: , Rfl:     acetaminophen (TYLENOL) 325 mg tablet, Take 2 tablets (650 mg total) by mouth every 6 (six) hours as needed for mild pain, headaches or fever (Patient not taking: Reported on 11/5/2024), Disp: 30 tablet, Rfl: 0    ondansetron (ZOFRAN) 4 mg tablet, Take 1 tablet (4 mg total) by mouth every 6 (six) hours (Patient not taking: Reported on 11/5/2024), Disp: 12 tablet, Rfl: 0    sucralfate (CARAFATE) 1 g tablet, Take 1 tablet (1 g total) by mouth 4 (four) times a day for 14 days, Disp: 56 tablet, Rfl: 0  No current facility-administered medications for this visit.    Current Allergies     Allergies as of 11/05/2024    (No Known Allergies)            The following portions of the patient's history were reviewed and updated as appropriate: allergies, current medications, past family history, past medical history, past social history, past surgical history and problem list.     Past Medical History:   Diagnosis Date    Anxiety     Hypertension     Thalassemia        Past Surgical History:   Procedure Laterality Date    HERNIA REPAIR         Family History   Problem Relation Age of Onset    Hypertension Father          Medications have been verified.        Objective   /86   Pulse 65   Temp (!) 97.1 °F (36.2 °C)   Resp 16   Ht 5' 10\" (1.778 m)   Wt (!) 193 kg (425 lb)   SpO2 95%   BMI 60.98 kg/m²        Physical Exam "     Physical Exam  Vitals and nursing note reviewed.   Constitutional:       General: He is not in acute distress.     Appearance: Normal appearance. He is normal weight. He is not ill-appearing, toxic-appearing or diaphoretic.   Cardiovascular:      Rate and Rhythm: Normal rate and regular rhythm.      Pulses: Normal pulses.      Heart sounds: Normal heart sounds. No murmur heard.     No friction rub. No gallop.   Pulmonary:      Effort: Pulmonary effort is normal. No respiratory distress.      Breath sounds: Normal breath sounds. No stridor. No wheezing, rhonchi or rales.   Chest:      Chest wall: No tenderness.   Abdominal:      Tenderness: There is no right CVA tenderness or left CVA tenderness.   Musculoskeletal:      Lumbar back: Tenderness present. No swelling, edema, deformity, signs of trauma, lacerations, spasms or bony tenderness. Normal range of motion. Negative right straight leg raise test and negative left straight leg raise test. No scoliosis.      Comments: Generalized paraspinal lower back muscular tenderness.  No midline step-off or deformity.  Full range of motion with lower back with pain complaints primarily with flexion.  5 out of 5 hip strength bilaterally with +2 deep tendon reflexes   Neurological:      Mental Status: He is alert.

## 2024-11-13 ENCOUNTER — HOSPITAL ENCOUNTER (EMERGENCY)
Facility: HOSPITAL | Age: 39
Discharge: HOME | End: 2024-11-13
Attending: EMERGENCY MEDICINE | Admitting: EMERGENCY MEDICINE

## 2024-11-13 ENCOUNTER — APPOINTMENT (EMERGENCY)
Dept: RADIOLOGY | Facility: HOSPITAL | Age: 39
End: 2024-11-13

## 2024-11-13 VITALS
WEIGHT: 315 LBS | BODY MASS INDEX: 61.7 KG/M2 | SYSTOLIC BLOOD PRESSURE: 186 MMHG | DIASTOLIC BLOOD PRESSURE: 83 MMHG | HEART RATE: 80 BPM | OXYGEN SATURATION: 97 % | RESPIRATION RATE: 20 BRPM | TEMPERATURE: 100.6 F

## 2024-11-13 DIAGNOSIS — B34.9 VIRAL ILLNESS: Primary | ICD-10-CM

## 2024-11-13 LAB
ANION GAP SERPL CALCULATED.3IONS-SCNC: 7 MMOL/L (ref 4–13)
BASOPHILS # BLD AUTO: 0.07 THOUSANDS/ÂΜL (ref 0–0.1)
BASOPHILS NFR BLD AUTO: 1 % (ref 0–1)
BUN SERPL-MCNC: 16 MG/DL (ref 5–25)
CALCIUM SERPL-MCNC: 9 MG/DL (ref 8.4–10.2)
CHLORIDE SERPL-SCNC: 104 MMOL/L (ref 96–108)
CO2 SERPL-SCNC: 28 MMOL/L (ref 21–32)
CREAT SERPL-MCNC: 1.02 MG/DL (ref 0.6–1.3)
EOSINOPHIL # BLD AUTO: 0.24 THOUSAND/ÂΜL (ref 0–0.61)
EOSINOPHIL NFR BLD AUTO: 2 % (ref 0–6)
ERYTHROCYTE [DISTWIDTH] IN BLOOD BY AUTOMATED COUNT: 15.5 % (ref 11.6–15.1)
FLUAV AG UPPER RESP QL IA.RAPID: NEGATIVE
FLUBV AG UPPER RESP QL IA.RAPID: NEGATIVE
GFR SERPL CREATININE-BSD FRML MDRD: 91 ML/MIN/1.73SQ M
GLUCOSE SERPL-MCNC: 98 MG/DL (ref 65–140)
HCT VFR BLD AUTO: 37.2 % (ref 36.5–49.3)
HGB BLD-MCNC: 11.4 G/DL (ref 12–17)
IMM GRANULOCYTES # BLD AUTO: 0.22 THOUSAND/UL (ref 0–0.2)
IMM GRANULOCYTES NFR BLD AUTO: 2 % (ref 0–2)
LYMPHOCYTES # BLD AUTO: 1.4 THOUSANDS/ÂΜL (ref 0.6–4.47)
LYMPHOCYTES NFR BLD AUTO: 10 % (ref 14–44)
MCH RBC QN AUTO: 19.7 PG (ref 26.8–34.3)
MCHC RBC AUTO-ENTMCNC: 30.6 G/DL (ref 31.4–37.4)
MCV RBC AUTO: 64 FL (ref 82–98)
MONOCYTES # BLD AUTO: 0.78 THOUSAND/ÂΜL (ref 0.17–1.22)
MONOCYTES NFR BLD AUTO: 6 % (ref 4–12)
NEUTROPHILS # BLD AUTO: 11.08 THOUSANDS/ÂΜL (ref 1.85–7.62)
NEUTS SEG NFR BLD AUTO: 79 % (ref 43–75)
NRBC BLD AUTO-RTO: 0 /100 WBCS
PLATELET # BLD AUTO: 316 THOUSANDS/UL (ref 149–390)
PMV BLD AUTO: 10.4 FL (ref 8.9–12.7)
POTASSIUM SERPL-SCNC: 3.7 MMOL/L (ref 3.5–5.3)
RBC # BLD AUTO: 5.78 MILLION/UL (ref 3.88–5.62)
S PYO DNA THROAT QL NAA+PROBE: NOT DETECTED
SARS-COV+SARS-COV-2 AG RESP QL IA.RAPID: NEGATIVE
SODIUM SERPL-SCNC: 139 MMOL/L (ref 135–147)
WBC # BLD AUTO: 13.79 THOUSAND/UL (ref 4.31–10.16)

## 2024-11-13 PROCEDURE — 99284 EMERGENCY DEPT VISIT MOD MDM: CPT | Performed by: EMERGENCY MEDICINE

## 2024-11-13 PROCEDURE — 36415 COLL VENOUS BLD VENIPUNCTURE: CPT | Performed by: EMERGENCY MEDICINE

## 2024-11-13 PROCEDURE — 99283 EMERGENCY DEPT VISIT LOW MDM: CPT

## 2024-11-13 PROCEDURE — 87804 INFLUENZA ASSAY W/OPTIC: CPT | Performed by: EMERGENCY MEDICINE

## 2024-11-13 PROCEDURE — 80048 BASIC METABOLIC PNL TOTAL CA: CPT | Performed by: EMERGENCY MEDICINE

## 2024-11-13 PROCEDURE — 71046 X-RAY EXAM CHEST 2 VIEWS: CPT

## 2024-11-13 PROCEDURE — 96361 HYDRATE IV INFUSION ADD-ON: CPT

## 2024-11-13 PROCEDURE — 96374 THER/PROPH/DIAG INJ IV PUSH: CPT

## 2024-11-13 PROCEDURE — 87811 SARS-COV-2 COVID19 W/OPTIC: CPT | Performed by: EMERGENCY MEDICINE

## 2024-11-13 PROCEDURE — 87651 STREP A DNA AMP PROBE: CPT | Performed by: EMERGENCY MEDICINE

## 2024-11-13 PROCEDURE — 85025 COMPLETE CBC W/AUTO DIFF WBC: CPT | Performed by: EMERGENCY MEDICINE

## 2024-11-13 RX ORDER — NAPROXEN 500 MG/1
500 TABLET ORAL 2 TIMES DAILY PRN
Qty: 30 TABLET | Refills: 0 | Status: SHIPPED | OUTPATIENT
Start: 2024-11-13

## 2024-11-13 RX ORDER — OXYCODONE HYDROCHLORIDE 5 MG/1
5 TABLET ORAL ONCE
Refills: 0 | Status: COMPLETED | OUTPATIENT
Start: 2024-11-13 | End: 2024-11-13

## 2024-11-13 RX ORDER — KETOROLAC TROMETHAMINE 30 MG/ML
15 INJECTION, SOLUTION INTRAMUSCULAR; INTRAVENOUS ONCE
Status: COMPLETED | OUTPATIENT
Start: 2024-11-13 | End: 2024-11-13

## 2024-11-13 RX ORDER — ACETAMINOPHEN 325 MG/1
650 TABLET ORAL ONCE
Status: COMPLETED | OUTPATIENT
Start: 2024-11-13 | End: 2024-11-13

## 2024-11-13 RX ADMIN — OXYCODONE HYDROCHLORIDE 5 MG: 5 TABLET ORAL at 12:22

## 2024-11-13 RX ADMIN — KETOROLAC TROMETHAMINE 15 MG: 30 INJECTION, SOLUTION INTRAMUSCULAR at 11:05

## 2024-11-13 RX ADMIN — SODIUM CHLORIDE 1000 ML: 0.9 INJECTION, SOLUTION INTRAVENOUS at 11:05

## 2024-11-13 RX ADMIN — ACETAMINOPHEN 325MG 650 MG: 325 TABLET ORAL at 10:23

## 2024-11-13 NOTE — ED PROVIDER NOTES
Time reflects when diagnosis was documented in both MDM as applicable and the Disposition within this note       Time User Action Codes Description Comment    11/13/2024 11:37 AM Jesse Robin Add [B34.9] Viral illness           ED Disposition       ED Disposition   Discharge    Condition   Stable    Date/Time   Wed Nov 13, 2024 11:36 AM    Comment   Yusef Mendieta discharge to home/self care.                   Assessment & Plan       Medical Decision Making  Patient presented to the emergency department and a MSE was performed. The patient was evaluated for complaint related to acute viral-like symptoms.  Patient is potentially at risk for, but not limited to, common cold, bronchitis, pneumonia, influenza, COVID.  Several of these diagnoses have been evaluated and ruled out by history and physical.  As needed, patient will be further evaluated with laboratory and imaging studies.  Higher level diagnostics, such as CT imaging or ultrasound, may also be required.  Please see work-up portion of the note for further evaluation of patient's risk.  Socioeconomic factors were also considered as part of the decision-making process.  Unless otherwise stated in the chart or patient is admitted as elsewhere documented, any previously prescribed medications will be maintained.      Problems Addressed:  Viral illness: acute illness or injury     Details: Long conversation with patient regarding his symptoms and need for return with any worsening.  Anticipate that this is a viral illness which will just need supportive care.  However he should return should he develop worsening or new symptoms.  There is no evidence at this time of a more significant illness.    Amount and/or Complexity of Data Reviewed  Labs: ordered. Decision-making details documented in ED Course.  Radiology: ordered.    Risk  OTC drugs.  Prescription drug management.        ED Course as of 11/13/24 1225   Wed Nov 13, 2024   1134 STREP A PCR: Not Detected    1136 Chest x-ray discussed with radiology.  No focal pneumonia.  Patient with likely viral illness.  Stable for discharge.   1211 Chemistries are negative.       Medications   acetaminophen (TYLENOL) tablet 650 mg (650 mg Oral Given 11/13/24 1023)   sodium chloride 0.9 % bolus 1,000 mL (1,000 mL Intravenous New Bag 11/13/24 1105)   ketorolac (TORADOL) injection 15 mg (15 mg Intravenous Given 11/13/24 1105)   oxyCODONE (ROXICODONE) IR tablet 5 mg (5 mg Oral Given 11/13/24 1222)       ED Risk Strat Scores                           SBIRT 20yo+      Flowsheet Row Most Recent Value   Initial Alcohol Screen: US AUDIT-C     1. How often do you have a drink containing alcohol? 0 Filed at: 11/13/2024 0958   2. How many drinks containing alcohol do you have on a typical day you are drinking?  0 Filed at: 11/13/2024 0958   3a. Male UNDER 65: How often do you have five or more drinks on one occasion? 0 Filed at: 11/13/2024 0958   3b. FEMALE Any Age, or MALE 65+: How often do you have 4 or more drinks on one occassion? 0 Filed at: 11/13/2024 0958   Audit-C Score 0 Filed at: 11/13/2024 0958   ALEXANDER: How many times in the past year have you...    Used an illegal drug or used a prescription medication for non-medical reasons? Never Filed at: 11/13/2024 0958                            History of Present Illness       Chief Complaint   Patient presents with    Flu Symptoms    Back Pain     PT reports back pain x2 weeks, flu symptoms since yesterday. At home covid test negative. PT did take dayquil aprx 0700.       Past Medical History:   Diagnosis Date    Anxiety     Hypertension     Thalassemia       Past Surgical History:   Procedure Laterality Date    HERNIA REPAIR        Family History   Problem Relation Age of Onset    Hypertension Father       Social History     Tobacco Use    Smoking status: Never    Smokeless tobacco: Never   Vaping Use    Vaping status: Never Used   Substance Use Topics    Alcohol use: Yes     Comment:  "occasionally    Drug use: Never      E-Cigarette/Vaping    E-Cigarette Use Never User       E-Cigarette/Vaping Substances    Nicotine No     THC No     CBD No     Flavoring No     Other No     Unknown No       I have reviewed and agree with the history as documented.     39-year-old male to the emergency room with chief complaint of feeling ill since Monday.  Started with generalized bodyaches and generalized weakness now with fever.  Reports that he is having \"pain all over\" as well as exacerbation of back pain for which he is scheduled for an epidural on Saturday.  Patient is also having congestion and sore throat.  Also with cough.      History provided by:  Patient and spouse  Flu Symptoms  Presenting symptoms: cough, fatigue, fever, myalgias and sore throat    Presenting symptoms: no shortness of breath    Back Pain  Associated symptoms: fever        Review of Systems   Constitutional:  Positive for fatigue and fever.   HENT:  Positive for sore throat.    Respiratory:  Positive for cough. Negative for shortness of breath.    Musculoskeletal:  Positive for back pain and myalgias.           Objective       ED Triage Vitals [11/13/24 0956]   Temperature Pulse Blood Pressure Respirations SpO2 Patient Position - Orthostatic VS   (!) 100.6 °F (38.1 °C) 79 (!) 216/102 18 96 % Sitting      Temp Source Heart Rate Source BP Location FiO2 (%) Pain Score    Temporal Monitor Left arm -- 9      Vitals      Date and Time Temp Pulse SpO2 Resp BP Pain Score FACES Pain Rating User   11/13/24 1222 -- -- -- -- -- 9 -- AM   11/13/24 1152 -- 80 97 % 20 186/83 9 -- AM   11/13/24 1105 -- -- -- -- -- 9 -- [facility]   11/13/24 1023 -- -- -- -- -- 9 -- [facility]   11/13/24 0956 100.6 °F (38.1 °C) 79 96 % 18 216/102 9 -- MY            Physical Exam  Vitals (BMI greater than 60) and nursing note reviewed.   Constitutional:       General: He is in acute distress.      Appearance: He is normal weight. He is not ill-appearing or toxic-appearing.   HENT: "      Head: Normocephalic and atraumatic.      Right Ear: External ear normal.      Left Ear: External ear normal.      Nose: Nose normal.      Mouth/Throat:      Mouth: Mucous membranes are moist.      Pharynx: Posterior oropharyngeal erythema present. No oropharyngeal exudate.   Cardiovascular:      Rate and Rhythm: Normal rate.      Heart sounds: No murmur heard.  Pulmonary:      Effort: Pulmonary effort is normal. No respiratory distress.      Breath sounds: No wheezing.   Abdominal:      General: Abdomen is flat. There is no distension.   Musculoskeletal:         General: No signs of injury.   Skin:     Coloration: Skin is not pale.   Neurological:      General: No focal deficit present.      Mental Status: He is alert.         Results Reviewed       Procedure Component Value Units Date/Time    Basic metabolic panel [[id number]] Collected: 11/13/24 1149    Lab Status: Final result Specimen: Blood from Arm, Right Updated: 11/13/24 1210     Sodium 139 mmol/L      Potassium 3.7 mmol/L      Chloride 104 mmol/L      CO2 28 mmol/L      ANION GAP 7 mmol/L      BUN 16 mg/dL      Creatinine 1.02 mg/dL      Glucose 98 mg/dL      Calcium 9.0 mg/dL      eGFR 91 ml/min/1.73sq m     Narrative:      National Kid[employer] Foundation guidelines for Chronic Kidney Disease (CKD):     Stage 1 with normal or high GFR (GFR > 90 mL/min/1.73 square meters)    Stage 2 Mild CKD (GFR = 60-89 mL/min/1.73 square meters)    Stage 3A Moderate CKD (GFR = 45-59 mL/min/1.73 square meters)    Stage 3B Moderate CKD (GFR = 30-44 mL/min/1.73 square meters)    Stage 4 Severe CKD (GFR = 15-29 mL/min/1.73 square meters)    Stage 5 End Stage CKD (GFR <15 mL/min/1.73 square meters)  Note: GFR calculation is accurate only with a steady state creatinine    Strep A PCR [[id number]]  (Normal) Collected: 11/13/24 1056    Lab Status: Final result Specimen: Throat Updated: 11/13/24 1129     STREP A PCR Not Detected    CBC and differential [[id number]]   (Abnormal) Collected: 11/13/24 1056    Lab Status: Final result Specimen: Blood from Heel, Left Updated: 11/13/24 1104     WBC 13.79 Thousand/uL      RBC 5.78 Million/uL      Hemoglobin 11.4 g/dL      Hematocrit 37.2 %      MCV 64 fL      MCH 19.7 pg      MCHC 30.6 g/dL      RDW 15.5 %      MPV 10.4 fL      Platelets 316 Thousands/uL      nRBC 0 /100 WBCs      Segmented % 79 %      Immature Grans % 2 %      Lymphocytes % 10 %      Monocytes % 6 %      Eosinophils Relative 2 %      Basophils Relative 1 %      Absolute Neutrophils 11.08 Thousands/µL      Absolute Immature Grans 0.22 Thousand/uL      Absolute Lymphocytes 1.40 Thousands/µL      Absolute Monocytes 0.78 Thousand/µL      Eosinophils Absolute 0.24 Thousand/µL      Basophils Absolute 0.07 Thousands/µL     FLU/COVID Rapid Antigen (30 min. TAT) - Preferred screening test in ED [[id number]]  (Normal) Collected: 11/13/24 1024    Lab Status: Final result Specimen: Nares from Nose Updated: 11/13/24 1045     SARS COV Rapid Antigen Negative     Influenza A Rapid Antigen Negative     Influenza B Rapid Antigen Negative    Narrative:      This test has been performed using the [employer] 2 FLU+SARS Antigen test under the Emergency Use Authorization (EUA). This test has been validated by the  and verified by the performing laboratory. The Bib[employer] uses lateral flow immunofluorescent sandwich assay to detect SARS-COV, Influenza A and Influenza B Antigen.     The [employer] 2 SARS Antigen test does not differentiate between SARS-CoV and SARS-CoV-2.     Negative results are presumptive and may be confirmed with a molecular assay, if necessary, for patient management. Negative results do not rule out SARS-CoV-2 or influenza infection and should not be used as the sole basis for treatment or patient management decisions. A negative test result may occur if the level of antigen in a sample is below the limit of detection of this test.     Positive results are  indicative of the presence of viral antigens, but do not rule out bacterial infection or co-infection with other viruses.     All test results should be used as an adjunct to clinical observations and other information available to the provider.    FOR PEDIATRIC PATIENTS - copy/paste COVID Guidelines URL to browser: [email or web address]            XR chest 2 views   Final Interpretation by Paulino Dick MD (11/13 1146)   Mild interstitial prominence is probably related to level of inspiration. No focal infiltrate.      This was discussed with Dr. Molina.            Workstation performed: [id number]             Procedures    ED Medication and Procedure Management   Prior to Admission Medications   Prescriptions Last Dose Informant Patient Reported? Taking?   DULoxetine (CYMBALTA) 60 mg delayed release capsule   Yes No   Sig: Take 60 mg by mouth daily   cyclobenzaprine (FLEXERIL) 10 mg tablet   No No   Sig: Take 1 tablet (10 mg total) by mouth 3 (three) times a day as needed for muscle spasms   fenofibrate (TRICOR) 145 mg tablet   Yes No   Sig: Take 145 mg by mouth daily   hydroCHLOROthiazide 25 mg tablet   Yes No   Sig: Take 25 mg by mouth daily   pantoprazole (PROTONIX) 40 mg tablet   Yes No   Sig: Take 40 mg by mouth daily   potassium chloride (Klor-Con M10) 10 mEq tablet   Yes No   Sig: Take 10 mEq by mouth daily   sucralfate (CARAFATE) 1 g tablet   No No   Sig: Take 1 tablet (1 g total) by mouth 4 (four) times a day for 14 days   traZODone (DESYREL) 100 mg tablet   Yes No   Sig: Take 100 mg by mouth   valsartan (DIOVAN) 320 MG tablet   Yes No   Sig: Take 320 mg by mouth daily      Facility-Administered Medications: None     Patient's Medications   Discharge Prescriptions    NAPROXEN (NAPROSYN) 500 MG TABLET    Take 1 tablet (500 mg total) by mouth 2 (two) times a day as needed for mild pain or moderate pain       Start Date: 11/13/2024End Date: --        Order Dose: 500 mg       Quantity: 30 tablet    Refills: 0     No discharge procedures on file.  ED SEPSIS DOCUMENTATION   Time reflects when diagnosis was documented in both MDM as applicable and the Disposition within this note       Time User Action Codes Description Comment    11/13/2024 11:37 AM Robin Molina Add [B34.9] Viral illness                  Robin Molina, DO  11/13/24 1222       Robin Molina, DO  11/13/24 1233

## 2024-11-13 NOTE — Clinical Note
Yusef Mendieta was seen and treated in our emergency department on 11/13/2024.                Diagnosis:     Yusef  may return to work on return date.    He may return on this date: 11/18/2024         If you have any questions or concerns, please don't hesitate to call.      Robin Molina, DO    ______________________________           _______________          _______________  Hospital Representative                              Date                                Time

## 2024-11-13 NOTE — DISCHARGE INSTRUCTIONS
Symptoms are consistent with a viral illness.  Follow-up with your family doctor as needed.  Return with any worsening.

## 2025-05-16 ENCOUNTER — HOSPITAL ENCOUNTER (EMERGENCY)
Facility: HOSPITAL | Age: 40
Discharge: HOME | End: 2025-05-16
Attending: EMERGENCY MEDICINE

## 2025-05-16 ENCOUNTER — APPOINTMENT (EMERGENCY)
Dept: CT IMAGING | Facility: HOSPITAL | Age: 40
End: 2025-05-16

## 2025-05-16 VITALS
WEIGHT: 315 LBS | HEART RATE: 82 BPM | TEMPERATURE: 97.1 F | RESPIRATION RATE: 16 BRPM | DIASTOLIC BLOOD PRESSURE: 71 MMHG | OXYGEN SATURATION: 96 % | BODY MASS INDEX: 60.26 KG/M2 | SYSTOLIC BLOOD PRESSURE: 156 MMHG

## 2025-05-16 DIAGNOSIS — R11.0 NAUSEA: ICD-10-CM

## 2025-05-16 DIAGNOSIS — R10.13 EPIGASTRIC PAIN: Primary | ICD-10-CM

## 2025-05-16 DIAGNOSIS — R16.0 HEPATOMEGALY: ICD-10-CM

## 2025-05-16 DIAGNOSIS — R79.89 ELEVATED LFTS: ICD-10-CM

## 2025-05-16 LAB
ALBUMIN SERPL BCG-MCNC: 5.1 G/DL (ref 3.5–5)
ALP SERPL-CCNC: 42 U/L (ref 34–104)
ALT SERPL W P-5'-P-CCNC: 60 U/L (ref 7–52)
ANION GAP SERPL CALCULATED.3IONS-SCNC: 8 MMOL/L (ref 4–13)
APTT PPP: 27 SECONDS (ref 23–34)
AST SERPL W P-5'-P-CCNC: 59 U/L (ref 13–39)
BASOPHILS # BLD AUTO: 0.09 THOUSANDS/ÂΜL (ref 0–0.1)
BASOPHILS NFR BLD AUTO: 1 % (ref 0–1)
BILIRUB SERPL-MCNC: 1.03 MG/DL (ref 0.2–1)
BILIRUB UR QL STRIP: ABNORMAL
BUN SERPL-MCNC: 22 MG/DL (ref 5–25)
CALCIUM SERPL-MCNC: 10.2 MG/DL (ref 8.4–10.2)
CHLORIDE SERPL-SCNC: 103 MMOL/L (ref 96–108)
CLARITY UR: CLEAR
CO2 SERPL-SCNC: 27 MMOL/L (ref 21–32)
COLOR UR: YELLOW
CREAT SERPL-MCNC: 1.32 MG/DL (ref 0.6–1.3)
EOSINOPHIL # BLD AUTO: 0.21 THOUSAND/ÂΜL (ref 0–0.61)
EOSINOPHIL NFR BLD AUTO: 2 % (ref 0–6)
ERYTHROCYTE [DISTWIDTH] IN BLOOD BY AUTOMATED COUNT: 17.2 % (ref 11.6–15.1)
GFR SERPL CREATININE-BSD FRML MDRD: 66 ML/MIN/1.73SQ M
GLUCOSE SERPL-MCNC: 78 MG/DL (ref 65–140)
GLUCOSE UR STRIP-MCNC: NEGATIVE MG/DL
HCT VFR BLD AUTO: 43.6 % (ref 36.5–49.3)
HGB BLD-MCNC: 13.3 G/DL (ref 12–17)
HGB UR QL STRIP.AUTO: NEGATIVE
IMM GRANULOCYTES # BLD AUTO: 0.09 THOUSAND/UL (ref 0–0.2)
IMM GRANULOCYTES NFR BLD AUTO: 1 % (ref 0–2)
INR PPP: 1 (ref 0.85–1.19)
KETONES UR STRIP-MCNC: NEGATIVE MG/DL
LACTATE SERPL-SCNC: 0.8 MMOL/L (ref 0.5–2)
LEUKOCYTE ESTERASE UR QL STRIP: NEGATIVE
LIPASE SERPL-CCNC: 30 U/L (ref 11–82)
LYMPHOCYTES # BLD AUTO: 2.68 THOUSANDS/ÂΜL (ref 0.6–4.47)
LYMPHOCYTES NFR BLD AUTO: 20 % (ref 14–44)
MCH RBC QN AUTO: 19.4 PG (ref 26.8–34.3)
MCHC RBC AUTO-ENTMCNC: 30.5 G/DL (ref 31.4–37.4)
MCV RBC AUTO: 64 FL (ref 82–98)
MONOCYTES # BLD AUTO: 0.79 THOUSAND/ÂΜL (ref 0.17–1.22)
MONOCYTES NFR BLD AUTO: 6 % (ref 4–12)
NEUTROPHILS # BLD AUTO: 9.32 THOUSANDS/ÂΜL (ref 1.85–7.62)
NEUTS SEG NFR BLD AUTO: 70 % (ref 43–75)
NITRITE UR QL STRIP: NEGATIVE
NRBC BLD AUTO-RTO: 0 /100 WBCS
PH UR STRIP.AUTO: 5.5 [PH]
PLATELET # BLD AUTO: 413 THOUSANDS/UL (ref 149–390)
PMV BLD AUTO: 9.9 FL (ref 8.9–12.7)
POTASSIUM SERPL-SCNC: 4.4 MMOL/L (ref 3.5–5.3)
PROT SERPL-MCNC: 8.5 G/DL (ref 6.4–8.4)
PROT UR STRIP-MCNC: NEGATIVE MG/DL
PROTHROMBIN TIME: 13.6 SECONDS (ref 12.3–15)
RBC # BLD AUTO: 6.85 MILLION/UL (ref 3.88–5.62)
SODIUM SERPL-SCNC: 138 MMOL/L (ref 135–147)
SP GR UR STRIP.AUTO: 1.02 (ref 1–1.03)
UROBILINOGEN UR QL STRIP.AUTO: 1 E.U./DL
WBC # BLD AUTO: 13.18 THOUSAND/UL (ref 4.31–10.16)

## 2025-05-16 PROCEDURE — 99284 EMERGENCY DEPT VISIT MOD MDM: CPT

## 2025-05-16 PROCEDURE — 36415 COLL VENOUS BLD VENIPUNCTURE: CPT | Performed by: EMERGENCY MEDICINE

## 2025-05-16 PROCEDURE — 93005 ELECTROCARDIOGRAM TRACING: CPT

## 2025-05-16 PROCEDURE — 85610 PROTHROMBIN TIME: CPT | Performed by: EMERGENCY MEDICINE

## 2025-05-16 PROCEDURE — 83690 ASSAY OF LIPASE: CPT | Performed by: EMERGENCY MEDICINE

## 2025-05-16 PROCEDURE — 81003 URINALYSIS AUTO W/O SCOPE: CPT | Performed by: EMERGENCY MEDICINE

## 2025-05-16 PROCEDURE — 80053 COMPREHEN METABOLIC PANEL: CPT | Performed by: EMERGENCY MEDICINE

## 2025-05-16 PROCEDURE — 85730 THROMBOPLASTIN TIME PARTIAL: CPT | Performed by: EMERGENCY MEDICINE

## 2025-05-16 PROCEDURE — 83605 ASSAY OF LACTIC ACID: CPT | Performed by: EMERGENCY MEDICINE

## 2025-05-16 PROCEDURE — 85025 COMPLETE CBC W/AUTO DIFF WBC: CPT | Performed by: EMERGENCY MEDICINE

## 2025-05-16 PROCEDURE — 74177 CT ABD & PELVIS W/CONTRAST: CPT

## 2025-05-16 PROCEDURE — 96375 TX/PRO/DX INJ NEW DRUG ADDON: CPT

## 2025-05-16 PROCEDURE — 96374 THER/PROPH/DIAG INJ IV PUSH: CPT

## 2025-05-16 RX ORDER — PANTOPRAZOLE SODIUM 40 MG/1
40 TABLET, DELAYED RELEASE ORAL DAILY
Qty: 30 TABLET | Refills: 0 | Status: SHIPPED | OUTPATIENT
Start: 2025-05-16 | End: 2026-07-30

## 2025-05-16 RX ORDER — MORPHINE SULFATE 4 MG/ML
4 INJECTION, SOLUTION INTRAMUSCULAR; INTRAVENOUS ONCE
Status: COMPLETED | OUTPATIENT
Start: 2025-05-16 | End: 2025-05-16

## 2025-05-16 RX ORDER — OMEPRAZOLE 20 MG/1
20 CAPSULE, DELAYED RELEASE ORAL DAILY
Qty: 20 CAPSULE | Refills: 0 | Status: SHIPPED | OUTPATIENT
Start: 2025-05-16 | End: 2025-05-16

## 2025-05-16 RX ORDER — ONDANSETRON 4 MG/1
4 TABLET, ORALLY DISINTEGRATING ORAL EVERY 6 HOURS PRN
Qty: 12 TABLET | Refills: 0 | Status: SHIPPED | OUTPATIENT
Start: 2025-05-16

## 2025-05-16 RX ORDER — ONDANSETRON 2 MG/ML
4 INJECTION INTRAMUSCULAR; INTRAVENOUS ONCE
Status: COMPLETED | OUTPATIENT
Start: 2025-05-16 | End: 2025-05-16

## 2025-05-16 RX ADMIN — MORPHINE SULFATE 4 MG: 4 INJECTION INTRAVENOUS at 17:43

## 2025-05-16 RX ADMIN — ONDANSETRON 4 MG: 2 INJECTION, SOLUTION INTRAMUSCULAR; INTRAVENOUS at 17:42

## 2025-05-16 RX ADMIN — IOHEXOL 100 ML: 350 INJECTION, SOLUTION INTRAVENOUS at 18:27

## 2025-05-16 NOTE — ED PROVIDER NOTES
Time reflects when diagnosis was documented in both MDM as applicable and the Disposition within this note       Time User Action Codes Description Comment    5/16/2025  7:18 PM Terry Parker Add [R10.13] Epigastric pain     5/16/2025  7:18 PM Terry Parker Add [R11.0] Nausea     5/16/2025  7:20 PM RemTerry patino Add [R16.0] Hepatomegaly     5/16/2025  7:20 PM Terry Parker Add [R79.89] Elevated LFTs           ED Disposition       ED Disposition   Discharge    Condition   Stable    Date/Time   Fri May 16, 2025  7:18 PM    Comment   Yusef Mendieta discharge to home/self care.                   Assessment & Plan       Medical Decision Making  Differential diagnosis include but not limited to pancreatitis cholecystitis gastroenteritis bowel obstruction colitis diverticulitis.  Patient remained clinically and hemodynamically stable in the emergency department he is afebrile nontoxic well-appearing.  Workup in the ED revealed no evidence of acute intra-abdominal pathology.  For now we will recommend bland diet plenty fluids supportive care and antiemetics as needed and continue on antacids and recommend prompt follow-up with primary physician and gastroenterology referral provided for further evaluation and treatment. return precautions and anticipatory guidance discussed.      Problems Addressed:  Elevated LFTs: acute illness or injury  Epigastric pain: acute illness or injury  Hepatomegaly: acute illness or injury  Nausea: acute illness or injury    Amount and/or Complexity of Data Reviewed  Labs: ordered. Decision-making details documented in ED Course.  Radiology: ordered. Decision-making details documented in ED Course.  ECG/medicine tests: ordered and independent interpretation performed. Decision-making details documented in ED Course.    Risk  Prescription drug management.             Medications   ondansetron (ZOFRAN) injection 4 mg (4 mg Intravenous Given 5/16/25 9567)   morphine injection 4 mg (4 mg Intravenous  Given 5/16/25 1743)   iohexol (OMNIPAQUE) 350 MG/ML injection (MULTI-DOSE) 100 mL (100 mL Intravenous Given 5/16/25 1827)       ED Risk Strat Scores                    No data recorded        SBIRT 22yo+      Flowsheet Row Most Recent Value   Initial Alcohol Screen: US AUDIT-C     1. How often do you have a drink containing alcohol? 0 Filed at: 05/16/2025 1759   2. How many drinks containing alcohol do you have on a typical day you are drinking?  0 Filed at: 05/16/2025 1759   3a. Male UNDER 65: How often do you have five or more drinks on one occasion? 0 Filed at: 05/16/2025 1759   Audit-C Score 0 Filed at: 05/16/2025 1759   ALEXANDER: How many times in the past year have you...    Used an illegal drug or used a prescription medication for non-medical reasons? Never Filed at: 05/16/2025 1759                            History of Present Illness       Chief Complaint   Patient presents with    Abdominal Pain     Patient reporting upper abdominal pain starting yesterday after eating. Nausea, diarrhea.       Past Medical History:   Diagnosis Date    Anxiety     Hypertension     Thalassemia       Past Surgical History:   Procedure Laterality Date    HERNIA REPAIR        Family History   Problem Relation Age of Onset    Hypertension Father       Social History[1]   E-Cigarette/Vaping    E-Cigarette Use Never User       E-Cigarette/Vaping Substances    Nicotine No     THC No     CBD No     Flavoring No     Other No     Unknown No       I have reviewed and agree with the history as documented.     Patient is a 40-year-old male history of anxiety hypertension pancreatitis prior hernia repair no other abdominal surgery presents emergency department complaining of epigastric abdominal pain associate with nausea no vomiting no diarrhea constipation no fevers or chills symptoms started yesterday still present.        History provided by:  Patient  Abdominal Pain  Pain location:  Epigastric  Pain quality: aching and cramping    Pain  radiates to:  Does not radiate  Pain severity:  Moderate  Onset quality:  Gradual  Duration:  1 day  Timing:  Constant  Progression:  Worsening  Associated symptoms: nausea and shortness of breath    Associated symptoms: no chest pain, no constipation, no diarrhea, no fever and no vomiting        Review of Systems   Constitutional:  Negative for fever.   Respiratory:  Positive for shortness of breath.    Cardiovascular:  Negative for chest pain.   Gastrointestinal:  Positive for abdominal pain and nausea. Negative for constipation, diarrhea and vomiting.   All other systems reviewed and are negative.          Objective       ED Triage Vitals   Temperature Pulse Blood Pressure Respirations SpO2 Patient Position - Orthostatic VS   05/16/25 1725 05/16/25 1725 05/16/25 1725 05/16/25 1725 05/16/25 1725 05/16/25 1725   (!) 97.1 °F (36.2 °C) 86 (!) 172/79 19 98 % Sitting      Temp Source Heart Rate Source BP Location FiO2 (%) Pain Score    05/16/25 1725 05/16/25 1725 05/16/25 1745 -- 05/16/25 1725    Temporal Monitor Left arm  7      Vitals      Date and Time Temp Pulse SpO2 Resp BP Pain Score FACES Pain Rating User   05/16/25 1800 -- 82 96 % 16 156/71 -- -- CG   05/16/25 1745 -- 87 96 % 16 150/70 -- -- CG   05/16/25 1743 -- -- -- -- -- 6 -- CG   05/16/25 1725 97.1 °F (36.2 °C) 86 98 % 19 172/79 7 -- AS            Physical Exam  Vitals and nursing note reviewed.   Constitutional:       General: He is not in acute distress.     Appearance: Normal appearance.   HENT:      Head: Normocephalic and atraumatic.      Nose: Nose normal.     Eyes:      Conjunctiva/sclera: Conjunctivae normal.     Pulmonary:      Effort: Pulmonary effort is normal. No respiratory distress.   Abdominal:      Tenderness: There is abdominal tenderness in the right upper quadrant, epigastric area and left upper quadrant. There is no guarding or rebound.     Skin:     General: Skin is dry.     Neurological:      General: No focal deficit present.       Mental Status: He is alert and oriented to person, place, and time.         Results Reviewed       Procedure Component Value Units Date/Time    Comprehensive metabolic panel [[id number]]  (Abnormal) Collected: 05/16/25 1739    Lab Status: Final result Specimen: Blood from Arm, Left Updated: 05/16/25 1810     Sodium 138 mmol/L      Potassium 4.4 mmol/L      Chloride 103 mmol/L      CO2 27 mmol/L      ANION GAP 8 mmol/L      BUN 22 mg/dL      Creatinine 1.32 mg/dL      Glucose 78 mg/dL      Calcium 10.2 mg/dL      AST 59 U/L      ALT 60 U/L      Alkaline Phosphatase 42 U/L      Total Protein 8.5 g/dL      Albumin 5.1 g/dL      Total Bilirubin 1.03 mg/dL      eGFR 66 ml/min/1.73sq m     Narrative:      [employer] guidelines for Chronic Kidney Disease (CKD):     Stage 1 with normal or high GFR (GFR > 90 mL/min/1.73 square meters)    Stage 2 Mild CKD (GFR = 60-89 mL/min/1.73 square meters)    Stage 3A Moderate CKD (GFR = 45-59 mL/min/1.73 square meters)    Stage 3B Moderate CKD (GFR = 30-44 mL/min/1.73 square meters)    Stage 4 Severe CKD (GFR = 15-29 mL/min/1.73 square meters)    Stage 5 End Stage CKD (GFR <15 mL/min/1.73 square meters)  Note: GFR calculation is accurate only with a steady state creatinine    Lipase [[id number]]  (Normal) Collected: 05/16/25 1739    Lab Status: Final result Specimen: Blood from Arm, Left Updated: 05/16/25 1810     Lipase 30 u/L     UA w Reflex to Microscopic w Reflex to Culture [[id number]]  (Abnormal) Collected: 05/16/25 1739    Lab Status: Final result Specimen: Urine, Other Updated: 05/16/25 1809     Color, UA Yellow     Clarity, UA Clear     Specific Gravity, UA 1.025     pH, UA 5.5     Leukocytes, UA Negative     Nitrite, UA Negative     Protein, UA Negative mg/dl      Glucose, UA Negative mg/dl      Ketones, UA Negative mg/dl      Urobilinogen, UA 1.0 E.U./dl      Bilirubin, UA Small     Occult Blood, UA Negative    Lactic acid, plasma (w/reflex if result >  2.0) [[id number]]  (Normal) Collected: 05/16/25 1739    Lab Status: Final result Specimen: Blood from Arm, Left Updated: 05/16/25 1807     LACTIC ACID 0.8 mmol/L     Narrative:      Result may be elevated if tourniquet was used during collection.    Protime-INR [[id number]]  (Normal) Collected: 05/16/25 1739    Lab Status: Final result Specimen: Blood from Arm, Left Updated: 05/16/25 1803     Protime 13.6 seconds      INR 1.00    Narrative:      INR Therapeutic Range    Indication                                             INR Range      Atrial Fibrillation                                               2.0-3.0  Hypercoagulable State                                    2.0.2.3  Left Ventricular Asist Device                            2.0-3.0  Mechanical Heart Valve                                  -    Aortic(with afib, MI, embolism, HF, LA enlargement,    and/or coagulopathy)                                     2.0-3.0 (2.5-3.5)     Mitral                                                             2.5-3.5  Prosthetic/Bioprosthetic Heart Valve               2.0-3.0  Venous thromboembolism (VTE: VT, PE        2.0-3.0    APTT [[id number]]  (Normal) Collected: 05/16/25 1739    Lab Status: Final result Specimen: Blood from Arm, Left Updated: 05/16/25 1803     PTT 27 seconds     CBC and differential [[id number]]  (Abnormal) Collected: 05/16/25 1739    Lab Status: Final result Specimen: Blood from Arm, Left Updated: 05/16/25 1751     WBC 13.18 Thousand/uL      RBC 6.85 Million/uL      Hemoglobin 13.3 g/dL      Hematocrit 43.6 %      MCV 64 fL      MCH 19.4 pg      MCHC 30.5 g/dL      RDW 17.2 %      MPV 9.9 fL      Platelets 413 Thousands/uL      nRBC 0 /100 WBCs      Segmented % 70 %      Immature Grans % 1 %      Lymphocytes % 20 %      Monocytes % 6 %      Eosinophils Relative 2 %      Basophils Relative 1 %      Absolute Neutrophils 9.32 Thousands/µL      Absolute Immature Grans 0.09 Thousand/uL      Absolute Lymphocytes  2.68 Thousands/µL      Absolute Monocytes 0.79 Thousand/µL      Eosinophils Absolute 0.21 Thousand/µL      Basophils Absolute 0.09 Thousands/µL             CT abdomen pelvis with contrast   Final Interpretation by Matteo Murillo MD (05/16 1902)      No acute findings in the abdomen or pelvis.         Workstation performed: [id number]             ECG 12 Lead Documentation Only    Date/Time: 5/16/2025 5:30 PM    Performed by: Terry Parker DO  Authorized by: Terry Parker DO    ECG reviewed by me, the ED Provider: yes    Patient location:  ED  Quality:     Tracing quality:  Limited by artifact  Rate:     ECG rate:  84    ECG rate assessment: normal    Rhythm:     Rhythm: sinus rhythm    QRS:     QRS axis:  Normal    QRS intervals:  Normal  Conduction:     Conduction: normal    ST segments:     ST segments:  Normal  T waves:     T waves: normal        ED Medication and Procedure Management   Prior to Admission Medications   Prescriptions Last Dose Informant Patient Reported? Taking?   DULoxetine (CYMBALTA) 60 mg delayed release capsule   Yes No   Sig: Take 60 mg by mouth daily   cyclobenzaprine (FLEXERIL) 10 mg tablet   No No   Sig: Take 1 tablet (10 mg total) by mouth 3 (three) times a day as needed for muscle spasms   fenofibrate (TRICOR) 145 mg tablet   Yes No   Sig: Take 145 mg by mouth daily   hydroCHLOROthiazide 25 mg tablet   Yes No   Sig: Take 25 mg by mouth daily   naproxen (NAPROSYN) 500 mg tablet   No No   Sig: Take 1 tablet (500 mg total) by mouth 2 (two) times a day as needed for mild pain or moderate pain   pantoprazole (PROTONIX) 40 mg tablet   Yes No   Sig: Take 40 mg by mouth daily   pantoprazole (PROTONIX) 40 mg tablet   No Yes   Sig: Take 1 tablet (40 mg total) by mouth daily   potassium chloride (Klor-Con M10) 10 mEq tablet   Yes No   Sig: Take 10 mEq by mouth daily   sucralfate (CARAFATE) 1 g tablet   No No   Sig: Take 1 tablet (1 g total) by mouth 4 (four) times a day for 14 days    traZODone (DESYREL) 100 mg tablet   Yes No   Sig: Take 100 mg by mouth   valsartan (DIOVAN) 320 MG tablet   Yes No   Sig: Take 320 mg by mouth daily      Facility-Administered Medications: None     Patient's Medications   Discharge Prescriptions    ONDANSETRON (ZOFRAN-ODT) 4 MG DISINTEGRATING TABLET    Take 1 tablet (4 mg total) by mouth every 6 (six) hours as needed for nausea or vomiting       Start Date: 5/16/2025 End Date: --       Order Dose: 4 mg       Quantity: 12 tablet    Refills: 0       ED SEPSIS DOCUMENTATION   Time reflects when diagnosis was documented in both MDM as applicable and the Disposition within this note       Time User Action Codes Description Comment    5/16/2025  7:18 PM Terry Parker Add [R10.13] Epigastric pain     5/16/2025  7:18 PM Terry Parker Add [R11.0] Nausea     5/16/2025  7:20 PM Terry Parker Add [R16.0] Hepatomegaly     5/16/2025  7:20 PM Terry Parker [R79.89] Elevated LFTs                      [1]   Social History  Tobacco Use    Smoking status: Never    Smokeless tobacco: Never   Vaping Use    Vaping status: Never Used   Substance Use Topics    Alcohol use: Yes     Comment: occasionally    Drug use: Never        Terry Parker DO  05/16/25 1921

## 2025-05-19 LAB
ATRIAL RATE: 84 BPM
P AXIS: 34 DEGREES
PR INTERVAL: 142 MS
QRS AXIS: 49 DEGREES
QRSD INTERVAL: 100 MS
QT INTERVAL: 364 MS
QTC INTERVAL: 430 MS
T WAVE AXIS: 29 DEGREES
VENTRICULAR RATE: 84 BPM

## 2025-05-19 PROCEDURE — 93010 ELECTROCARDIOGRAM REPORT: CPT | Performed by: INTERNAL MEDICINE

## 2025-07-22 ENCOUNTER — OFFICE VISIT (OUTPATIENT)
Dept: URGENT CARE | Facility: CLINIC | Age: 40
End: 2025-07-22
Payer: COMMERCIAL

## 2025-07-22 VITALS
HEIGHT: 70 IN | SYSTOLIC BLOOD PRESSURE: 140 MMHG | OXYGEN SATURATION: 96 % | DIASTOLIC BLOOD PRESSURE: 88 MMHG | BODY MASS INDEX: 45.1 KG/M2 | HEART RATE: 60 BPM | RESPIRATION RATE: 18 BRPM | TEMPERATURE: 96.8 F | WEIGHT: 315 LBS

## 2025-07-22 DIAGNOSIS — L23.9 ALLERGIC CONTACT DERMATITIS, UNSPECIFIED TRIGGER: Primary | ICD-10-CM

## 2025-07-22 PROCEDURE — G0381 LEV 2 HOSP TYPE B ED VISIT: HCPCS | Performed by: PHYSICIAN ASSISTANT

## 2025-07-22 RX ORDER — CELECOXIB 400 MG/1
400 CAPSULE ORAL DAILY
COMMUNITY

## 2025-07-22 RX ORDER — FEXOFENADINE HCL AND PSEUDOEPHEDRINE HCL 180; 240 MG/1; MG/1
1 TABLET, EXTENDED RELEASE ORAL DAILY
Qty: 5 TABLET | Refills: 0 | Status: SHIPPED | OUTPATIENT
Start: 2025-07-22 | End: 2025-07-27

## 2025-07-22 RX ORDER — PREDNISONE 50 MG/1
50 TABLET ORAL DAILY
Qty: 5 TABLET | Refills: 0 | Status: SHIPPED | OUTPATIENT
Start: 2025-07-22 | End: 2025-07-27

## 2025-07-22 RX ORDER — HYDROCORTISONE 25 MG/G
CREAM TOPICAL 2 TIMES DAILY
Qty: 28 G | Refills: 0 | Status: SHIPPED | OUTPATIENT
Start: 2025-07-22 | End: 2025-08-05

## 2025-07-22 RX ORDER — AMLODIPINE BESYLATE 10 MG/1
10 TABLET ORAL DAILY
COMMUNITY
Start: 2024-12-19 | End: 2025-12-19

## 2025-07-22 NOTE — PROGRESS NOTES
[facility]  Name: Yusef Mendieta      : 1985      MRN: [id number]  Encounter Provider: Melony Smalls PA-C  Encounter Date: 2025   Encounter department: [facility]  :  Assessment & Plan  Allergic contact dermatitis, unspecified trigger    Orders:    hydrocortisone 2.5 % cream; Apply topically 2 (two) times a day for 14 days    predniSONE 50 mg tablet; Take 1 tablet (50 mg total) by mouth daily for 5 days    fexofenadine-pseudoephedrine (ALLEGRA-D 24) 180-240 MG per 24 hr tablet; Take 1 tablet by mouth daily for 5 days        Patient Instructions  Take medications as prescribed   Wash hands following administration  Oatmeal baths  Avoid scratching area  Cool compresses  Keep area clean and dry  Watch for signs of infection   Follow up with PCP in 3-5 days.  Proceed to  ER if symptoms worsen.    If tests are performed, our office will contact you with results only if changes need to made to the care plan discussed with you at the visit. You can review your full results on [facility].    Chief Complaint:   Chief Complaint   Patient presents with    Rash     Woke up today with itchy rash on left abdomen     History of Present Illness   Rash  This is a new problem. The current episode started today. The problem has been gradually worsening since onset. The affected locations include the abdomen. The problem is moderate. The rash is characterized by itchiness and blistering. He was exposed to nothing. The rash first occurred at home. Associated symptoms include itching. Pertinent negatives include no joint pain, shortness of breath, sore throat or vomiting. Past treatments include nothing. The treatment provided mild relief. There is no history of allergies. There were no sick contacts.     History obtained from: patient    Review of Systems   HENT:  Negative for sore throat.    Respiratory:  Negative for shortness of breath.    Gastrointestinal:   "Negative for vomiting.   Musculoskeletal:  Negative for joint pain.   Skin:  Positive for itching and rash.     Past Medical History   Past Medical History[1]  Past Surgical History[2]  Family History[3]  he reports that he has never smoked. He has never used smokeless tobacco. He reports current alcohol use. He reports that he does not use drugs.  Current Outpatient Medications   Medication Instructions    amLODIPine (NORVASC) 10 mg, Daily    celecoxib (CELEBREX) 400 mg, Oral, Daily    cyclobenzaprine (FLEXERIL) 10 mg, Oral, 3 times daily PRN    DULoxetine (CYMBALTA) 60 mg, Daily    fenofibrate (TRICOR) 145 mg, Daily    fexofenadine-pseudoephedrine (ALLEGRA-D 24) 180-240 MG per 24 hr tablet 1 tablet, Oral, Daily    hydroCHLOROthiazide 25 mg, Daily    hydrocortisone 2.5 % cream Topical, 2 times daily    naproxen (NAPROSYN) 500 mg, Oral, 2 times daily PRN    ondansetron (ZOFRAN-ODT) 4 mg, Oral, Every 6 hours PRN    pantoprazole (PROTONIX) 40 mg, Oral, Daily    potassium chloride (Klor-Con M10) 10 mEq tablet 10 mEq, Daily    predniSONE 50 mg, Oral, Daily    sucralfate (CARAFATE) 1 g, Oral, 4 times daily    traZODone (DESYREL) 100 mg    valsartan (DIOVAN) 320 mg, Daily   Allergies[4]     Objective   /88   Pulse 60   Temp (!) 96.8 °F (36 °C)   Resp 18   Ht 5' 10\" (1.778 m)   Wt (!) 193 kg (425 lb)   SpO2 96%   BMI 60.98 kg/m²      Physical Exam  Vitals and nursing note reviewed.   Constitutional:       General: He is not in acute distress.     Appearance: He is well-developed. He is not diaphoretic.   HENT:      Head: Normocephalic and atraumatic.     Cardiovascular:      Rate and Rhythm: Normal rate and regular rhythm.      Heart sounds: Normal heart sounds. No murmur heard.     No friction rub. No gallop.   Pulmonary:      Effort: Pulmonary effort is normal. No respiratory distress.      Breath sounds: Normal breath sounds. No wheezing or rales.   Chest:      Chest wall: No tenderness.   Lymphadenopathy: " "     Cervical: No cervical adenopathy.     Skin:     Findings: Rash present.      Comments: Erythematous, mildly edematous rash noted over the right side of the abdomen.  Lessons are patchy with areas of dryness and scaling.  No vesicles, pustules or open skin noted.  Patient reports intermittent itching sensation.  No signs of secondary infection such as warmth, purulent drainage or spreading erythema.     Neurological:      Mental Status: He is alert and oriented to person, place, and time.         Portions of the record may have been created with voice recognition software.  Occasional wrong word or \"sound a like\" substitutions may have occurred due to the inherent limitations of voice recognition software.  Read the chart carefully and recognize, using context, where substitutions have occurred.         [1]   Past Medical History:  Diagnosis Date    Anxiety     Hypertension     Thalassemia    [2]   Past Surgical History:  Procedure Laterality Date    HERNIA REPAIR     [3]   Family History  Problem Relation Name Age of Onset    Hypertension Father     [4] No Known Allergies    "